# Patient Record
Sex: FEMALE | Race: WHITE | NOT HISPANIC OR LATINO
[De-identification: names, ages, dates, MRNs, and addresses within clinical notes are randomized per-mention and may not be internally consistent; named-entity substitution may affect disease eponyms.]

---

## 2017-05-18 ENCOUNTER — APPOINTMENT (OUTPATIENT)
Dept: ORTHOPEDIC SURGERY | Facility: CLINIC | Age: 72
End: 2017-05-18

## 2017-05-18 VITALS
HEIGHT: 65 IN | DIASTOLIC BLOOD PRESSURE: 66 MMHG | HEART RATE: 89 BPM | SYSTOLIC BLOOD PRESSURE: 93 MMHG | WEIGHT: 135 LBS | BODY MASS INDEX: 22.49 KG/M2

## 2017-05-18 DIAGNOSIS — Z86.39 PERSONAL HISTORY OF OTHER ENDOCRINE, NUTRITIONAL AND METABOLIC DISEASE: ICD-10-CM

## 2017-05-18 DIAGNOSIS — Z87.898 PERSONAL HISTORY OF OTHER SPECIFIED CONDITIONS: ICD-10-CM

## 2017-05-18 DIAGNOSIS — Z78.9 OTHER SPECIFIED HEALTH STATUS: ICD-10-CM

## 2017-05-18 DIAGNOSIS — Z87.39 PERSONAL HISTORY OF OTHER DISEASES OF THE MUSCULOSKELETAL SYSTEM AND CONNECTIVE TISSUE: ICD-10-CM

## 2017-05-18 DIAGNOSIS — Z86.69 PERSONAL HISTORY OF OTHER DISEASES OF THE NERVOUS SYSTEM AND SENSE ORGANS: ICD-10-CM

## 2017-05-18 DIAGNOSIS — Z82.61 FAMILY HISTORY OF ARTHRITIS: ICD-10-CM

## 2017-05-18 DIAGNOSIS — Z82.62 FAMILY HISTORY OF OSTEOPOROSIS: ICD-10-CM

## 2017-05-18 DIAGNOSIS — Z60.2 PROBLEMS RELATED TO LIVING ALONE: ICD-10-CM

## 2017-05-18 DIAGNOSIS — Z87.891 PERSONAL HISTORY OF NICOTINE DEPENDENCE: ICD-10-CM

## 2017-05-18 RX ORDER — PNV NO.95/FERROUS FUM/FOLIC AC 28MG-0.8MG
TABLET ORAL
Refills: 0 | Status: ACTIVE | COMMUNITY

## 2017-05-18 RX ORDER — FENOFIBRATE 145 MG/1
TABLET ORAL
Refills: 0 | Status: ACTIVE | COMMUNITY

## 2017-05-18 RX ORDER — ZINC OXIDE 13 %
CREAM (GRAM) TOPICAL
Refills: 0 | Status: ACTIVE | COMMUNITY

## 2017-05-18 SDOH — SOCIAL STABILITY - SOCIAL INSECURITY: PROBLEMS RELATED TO LIVING ALONE: Z60.2

## 2017-06-15 ENCOUNTER — OUTPATIENT (OUTPATIENT)
Dept: OUTPATIENT SERVICES | Facility: HOSPITAL | Age: 72
LOS: 1 days | End: 2017-06-15
Payer: MEDICARE

## 2017-06-15 VITALS
SYSTOLIC BLOOD PRESSURE: 114 MMHG | HEART RATE: 73 BPM | OXYGEN SATURATION: 97 % | RESPIRATION RATE: 16 BRPM | HEIGHT: 65 IN | DIASTOLIC BLOOD PRESSURE: 79 MMHG | WEIGHT: 136.03 LBS | TEMPERATURE: 98 F

## 2017-06-15 DIAGNOSIS — Z98.890 OTHER SPECIFIED POSTPROCEDURAL STATES: Chronic | ICD-10-CM

## 2017-06-15 DIAGNOSIS — M48.06 SPINAL STENOSIS, LUMBAR REGION: ICD-10-CM

## 2017-06-15 DIAGNOSIS — G47.30 SLEEP APNEA, UNSPECIFIED: ICD-10-CM

## 2017-06-15 DIAGNOSIS — Z90.13 ACQUIRED ABSENCE OF BILATERAL BREASTS AND NIPPLES: Chronic | ICD-10-CM

## 2017-06-15 DIAGNOSIS — Z01.818 ENCOUNTER FOR OTHER PREPROCEDURAL EXAMINATION: ICD-10-CM

## 2017-06-15 DIAGNOSIS — M51.26 OTHER INTERVERTEBRAL DISC DISPLACEMENT, LUMBAR REGION: ICD-10-CM

## 2017-06-15 DIAGNOSIS — M43.17 SPONDYLOLISTHESIS, LUMBOSACRAL REGION: ICD-10-CM

## 2017-06-15 LAB
ANION GAP SERPL CALC-SCNC: 16 MMOL/L — SIGNIFICANT CHANGE UP (ref 5–17)
BLD GP AB SCN SERPL QL: NEGATIVE — SIGNIFICANT CHANGE UP
BUN SERPL-MCNC: 20 MG/DL — SIGNIFICANT CHANGE UP (ref 7–23)
CALCIUM SERPL-MCNC: 10.1 MG/DL — SIGNIFICANT CHANGE UP (ref 8.4–10.5)
CHLORIDE SERPL-SCNC: 104 MMOL/L — SIGNIFICANT CHANGE UP (ref 96–108)
CO2 SERPL-SCNC: 24 MMOL/L — SIGNIFICANT CHANGE UP (ref 22–31)
CREAT SERPL-MCNC: 0.64 MG/DL — SIGNIFICANT CHANGE UP (ref 0.5–1.3)
GLUCOSE SERPL-MCNC: 86 MG/DL — SIGNIFICANT CHANGE UP (ref 70–99)
HCT VFR BLD CALC: 39.7 % — SIGNIFICANT CHANGE UP (ref 34.5–45)
HGB BLD-MCNC: 13.6 G/DL — SIGNIFICANT CHANGE UP (ref 11.5–15.5)
MCHC RBC-ENTMCNC: 31.4 PG — SIGNIFICANT CHANGE UP (ref 27–34)
MCHC RBC-ENTMCNC: 34.3 GM/DL — SIGNIFICANT CHANGE UP (ref 32–36)
MCV RBC AUTO: 91.7 FL — SIGNIFICANT CHANGE UP (ref 80–100)
PLATELET # BLD AUTO: 198 K/UL — SIGNIFICANT CHANGE UP (ref 150–400)
POTASSIUM SERPL-MCNC: 4 MMOL/L — SIGNIFICANT CHANGE UP (ref 3.5–5.3)
POTASSIUM SERPL-SCNC: 4 MMOL/L — SIGNIFICANT CHANGE UP (ref 3.5–5.3)
RBC # BLD: 4.33 M/UL — SIGNIFICANT CHANGE UP (ref 3.8–5.2)
RBC # FLD: 12.4 % — SIGNIFICANT CHANGE UP (ref 10.3–14.5)
RH IG SCN BLD-IMP: NEGATIVE — SIGNIFICANT CHANGE UP
SODIUM SERPL-SCNC: 144 MMOL/L — SIGNIFICANT CHANGE UP (ref 135–145)
WBC # BLD: 5.53 K/UL — SIGNIFICANT CHANGE UP (ref 3.8–10.5)
WBC # FLD AUTO: 5.53 K/UL — SIGNIFICANT CHANGE UP (ref 3.8–10.5)

## 2017-06-15 PROCEDURE — 80048 BASIC METABOLIC PNL TOTAL CA: CPT

## 2017-06-15 PROCEDURE — G0463: CPT

## 2017-06-15 PROCEDURE — 86900 BLOOD TYPING SEROLOGIC ABO: CPT

## 2017-06-15 PROCEDURE — 86850 RBC ANTIBODY SCREEN: CPT

## 2017-06-15 PROCEDURE — 85027 COMPLETE CBC AUTOMATED: CPT

## 2017-06-15 PROCEDURE — 86901 BLOOD TYPING SEROLOGIC RH(D): CPT

## 2017-06-15 RX ORDER — VANCOMYCIN HCL 1 G
1000 VIAL (EA) INTRAVENOUS ONCE
Qty: 0 | Refills: 0 | Status: DISCONTINUED | OUTPATIENT
Start: 2017-06-29 | End: 2017-07-03

## 2017-06-15 NOTE — H&P PST ADULT - HISTORY OF PRESENT ILLNESS
72 y/o f with PMH RAMÓN (non compliant with C-pap), HLD, migraines with lower back pain radiating to both legs since December 2016 presents pre L3-L5 bilateral lumbar laminectomies, discectomies posterior spinal fusion scheduled for 6/29/17.

## 2017-06-15 NOTE — H&P PST ADULT - ATTENDING COMMENTS
the patient is having intractable back and leg pain- the nature of the planned surgery, other options available to the patient, the risks and possible complications of this surgery and the other options, including but not limited to death, paralysis, nerve damage, infection, bleeding, failure of the surgery to relieve  symptoms, failure of fusion, dislodgement of interbody graft, if used, hardware failure/breakage/loosening, use of DBM and inherent risks pulmonary and/or cardiac complications, DVT, PE, UTI, spinal fluid leakage, possible need for further surgery in the future, adjacent segment deterioration, etc etc were discussed with the patient at length and the patient understands and wishes to proceed TM

## 2017-06-15 NOTE — H&P PST ADULT - ACTIVITY
limited currently due to pain, prior to December able to climb 2 flights stairs, vacuum, carry grocery's, laundry

## 2017-06-23 ENCOUNTER — APPOINTMENT (OUTPATIENT)
Dept: ORTHOPEDIC SURGERY | Facility: CLINIC | Age: 72
End: 2017-06-23

## 2017-06-23 VITALS — WEIGHT: 135 LBS | BODY MASS INDEX: 22.49 KG/M2 | HEIGHT: 65 IN

## 2017-06-29 ENCOUNTER — TRANSCRIPTION ENCOUNTER (OUTPATIENT)
Age: 72
End: 2017-06-29

## 2017-06-29 ENCOUNTER — INPATIENT (INPATIENT)
Facility: HOSPITAL | Age: 72
LOS: 3 days | Discharge: HOME | DRG: 460 | End: 2017-07-03
Attending: ORTHOPAEDIC SURGERY | Admitting: ORTHOPAEDIC SURGERY
Payer: MEDICARE

## 2017-06-29 ENCOUNTER — RESULT REVIEW (OUTPATIENT)
Age: 72
End: 2017-06-29

## 2017-06-29 ENCOUNTER — APPOINTMENT (OUTPATIENT)
Dept: ORTHOPEDIC SURGERY | Facility: HOSPITAL | Age: 72
End: 2017-06-29

## 2017-06-29 VITALS
TEMPERATURE: 99 F | OXYGEN SATURATION: 97 % | HEART RATE: 76 BPM | SYSTOLIC BLOOD PRESSURE: 129 MMHG | DIASTOLIC BLOOD PRESSURE: 81 MMHG | WEIGHT: 136.03 LBS | HEIGHT: 65 IN | RESPIRATION RATE: 18 BRPM

## 2017-06-29 DIAGNOSIS — M43.17 SPONDYLOLISTHESIS, LUMBOSACRAL REGION: ICD-10-CM

## 2017-06-29 DIAGNOSIS — Z90.13 ACQUIRED ABSENCE OF BILATERAL BREASTS AND NIPPLES: Chronic | ICD-10-CM

## 2017-06-29 DIAGNOSIS — M51.26 OTHER INTERVERTEBRAL DISC DISPLACEMENT, LUMBAR REGION: ICD-10-CM

## 2017-06-29 DIAGNOSIS — Z98.890 OTHER SPECIFIED POSTPROCEDURAL STATES: Chronic | ICD-10-CM

## 2017-06-29 LAB
ANION GAP SERPL CALC-SCNC: 13 MMOL/L — SIGNIFICANT CHANGE UP (ref 5–17)
BUN SERPL-MCNC: 15 MG/DL — SIGNIFICANT CHANGE UP (ref 7–23)
CALCIUM SERPL-MCNC: 8.7 MG/DL — SIGNIFICANT CHANGE UP (ref 8.4–10.5)
CHLORIDE SERPL-SCNC: 111 MMOL/L — HIGH (ref 96–108)
CO2 SERPL-SCNC: 24 MMOL/L — SIGNIFICANT CHANGE UP (ref 22–31)
CREAT SERPL-MCNC: 0.43 MG/DL — LOW (ref 0.5–1.3)
GLUCOSE SERPL-MCNC: 162 MG/DL — HIGH (ref 70–99)
HCT VFR BLD CALC: 34.2 % — LOW (ref 34.5–45)
HGB BLD-MCNC: 12.5 G/DL — SIGNIFICANT CHANGE UP (ref 11.5–15.5)
MCHC RBC-ENTMCNC: 33.9 PG — SIGNIFICANT CHANGE UP (ref 27–34)
MCHC RBC-ENTMCNC: 36.4 GM/DL — HIGH (ref 32–36)
MCV RBC AUTO: 93.2 FL — SIGNIFICANT CHANGE UP (ref 80–100)
PLATELET # BLD AUTO: 169 K/UL — SIGNIFICANT CHANGE UP (ref 150–400)
POTASSIUM SERPL-MCNC: 3.7 MMOL/L — SIGNIFICANT CHANGE UP (ref 3.5–5.3)
POTASSIUM SERPL-SCNC: 3.7 MMOL/L — SIGNIFICANT CHANGE UP (ref 3.5–5.3)
RBC # BLD: 3.67 M/UL — LOW (ref 3.8–5.2)
RBC # FLD: 11.4 % — SIGNIFICANT CHANGE UP (ref 10.3–14.5)
RH IG SCN BLD-IMP: NEGATIVE — SIGNIFICANT CHANGE UP
SODIUM SERPL-SCNC: 148 MMOL/L — HIGH (ref 135–145)
WBC # BLD: 8.3 K/UL — SIGNIFICANT CHANGE UP (ref 3.8–10.5)
WBC # FLD AUTO: 8.3 K/UL — SIGNIFICANT CHANGE UP (ref 3.8–10.5)

## 2017-06-29 PROCEDURE — 22614 ARTHRD PST TQ 1NTRSPC EA ADD: CPT

## 2017-06-29 PROCEDURE — 22842 INSERT SPINE FIXATION DEVICE: CPT

## 2017-06-29 PROCEDURE — 22612 ARTHRD PST TQ 1NTRSPC LUMBAR: CPT

## 2017-06-29 PROCEDURE — 22614 ARTHRD PST TQ 1NTRSPC EA ADD: CPT | Mod: 82

## 2017-06-29 PROCEDURE — 72100 X-RAY EXAM L-S SPINE 2/3 VWS: CPT | Mod: 26

## 2017-06-29 PROCEDURE — 20936 SP BONE AGRFT LOCAL ADD-ON: CPT

## 2017-06-29 PROCEDURE — 22842 INSERT SPINE FIXATION DEVICE: CPT | Mod: 82

## 2017-06-29 PROCEDURE — 88311 DECALCIFY TISSUE: CPT | Mod: 26

## 2017-06-29 PROCEDURE — 63030 LAMOT DCMPRN NRV RT 1 LMBR: CPT | Mod: 59

## 2017-06-29 PROCEDURE — 22612 ARTHRD PST TQ 1NTRSPC LUMBAR: CPT | Mod: 82

## 2017-06-29 PROCEDURE — 63047 LAM FACETEC & FORAMOT LUMBAR: CPT

## 2017-06-29 PROCEDURE — 63048 LAM FACETEC &FORAMOT EA ADDL: CPT

## 2017-06-29 PROCEDURE — 63047 LAM FACETEC & FORAMOT LUMBAR: CPT | Mod: 82

## 2017-06-29 PROCEDURE — 88304 TISSUE EXAM BY PATHOLOGIST: CPT | Mod: 26

## 2017-06-29 PROCEDURE — 63030 LAMOT DCMPRN NRV RT 1 LMBR: CPT | Mod: 82,59

## 2017-06-29 PROCEDURE — 63048 LAM FACETEC &FORAMOT EA ADDL: CPT | Mod: 82

## 2017-06-29 PROCEDURE — 20930 SP BONE ALGRFT MORSEL ADD-ON: CPT

## 2017-06-29 RX ORDER — DEXAMETHASONE 0.5 MG/5ML
5 ELIXIR ORAL EVERY 6 HOURS
Qty: 0 | Refills: 0 | Status: COMPLETED | OUTPATIENT
Start: 2017-06-29 | End: 2017-06-30

## 2017-06-29 RX ORDER — NALOXONE HYDROCHLORIDE 4 MG/.1ML
0.1 SPRAY NASAL
Qty: 0 | Refills: 0 | Status: DISCONTINUED | OUTPATIENT
Start: 2017-06-29 | End: 2017-06-30

## 2017-06-29 RX ORDER — MAGNESIUM HYDROXIDE 400 MG/1
30 TABLET, CHEWABLE ORAL EVERY 12 HOURS
Qty: 0 | Refills: 0 | Status: DISCONTINUED | OUTPATIENT
Start: 2017-06-29 | End: 2017-06-29

## 2017-06-29 RX ORDER — DEXAMETHASONE 0.5 MG/5ML
1 ELIXIR ORAL EVERY 6 HOURS
Qty: 0 | Refills: 0 | Status: CANCELLED | OUTPATIENT
Start: 2017-07-04 | End: 2017-06-29

## 2017-06-29 RX ORDER — SODIUM CHLORIDE 9 MG/ML
500 INJECTION INTRAMUSCULAR; INTRAVENOUS; SUBCUTANEOUS ONCE
Qty: 0 | Refills: 0 | Status: DISCONTINUED | OUTPATIENT
Start: 2017-06-29 | End: 2017-06-29

## 2017-06-29 RX ORDER — HYDROMORPHONE HYDROCHLORIDE 2 MG/ML
30 INJECTION INTRAMUSCULAR; INTRAVENOUS; SUBCUTANEOUS
Qty: 0 | Refills: 0 | Status: DISCONTINUED | OUTPATIENT
Start: 2017-06-29 | End: 2017-06-30

## 2017-06-29 RX ORDER — DEXAMETHASONE 0.5 MG/5ML
2 ELIXIR ORAL EVERY 6 HOURS
Qty: 0 | Refills: 0 | Status: DISCONTINUED | OUTPATIENT
Start: 2017-06-29 | End: 2017-06-29

## 2017-06-29 RX ORDER — CYCLOBENZAPRINE HYDROCHLORIDE 10 MG/1
10 TABLET, FILM COATED ORAL EVERY 8 HOURS
Qty: 0 | Refills: 0 | Status: DISCONTINUED | OUTPATIENT
Start: 2017-06-29 | End: 2017-07-03

## 2017-06-29 RX ORDER — SODIUM CHLORIDE 9 MG/ML
1000 INJECTION, SOLUTION INTRAVENOUS
Qty: 0 | Refills: 0 | Status: DISCONTINUED | OUTPATIENT
Start: 2017-06-29 | End: 2017-06-29

## 2017-06-29 RX ORDER — FENOFIBRATE,MICRONIZED 130 MG
145 CAPSULE ORAL DAILY
Qty: 0 | Refills: 0 | Status: DISCONTINUED | OUTPATIENT
Start: 2017-06-29 | End: 2017-07-03

## 2017-06-29 RX ORDER — SODIUM CHLORIDE 9 MG/ML
1000 INJECTION, SOLUTION INTRAVENOUS
Qty: 0 | Refills: 0 | Status: DISCONTINUED | OUTPATIENT
Start: 2017-06-29 | End: 2017-07-03

## 2017-06-29 RX ORDER — HYDROMORPHONE HYDROCHLORIDE 2 MG/ML
0.5 INJECTION INTRAMUSCULAR; INTRAVENOUS; SUBCUTANEOUS EVERY 6 HOURS
Qty: 0 | Refills: 0 | Status: DISCONTINUED | OUTPATIENT
Start: 2017-06-29 | End: 2017-06-29

## 2017-06-29 RX ORDER — ONDANSETRON 8 MG/1
4 TABLET, FILM COATED ORAL EVERY 6 HOURS
Qty: 0 | Refills: 0 | Status: DISCONTINUED | OUTPATIENT
Start: 2017-06-29 | End: 2017-06-30

## 2017-06-29 RX ORDER — ONDANSETRON 8 MG/1
4 TABLET, FILM COATED ORAL EVERY 6 HOURS
Qty: 0 | Refills: 0 | Status: DISCONTINUED | OUTPATIENT
Start: 2017-06-29 | End: 2017-06-29

## 2017-06-29 RX ORDER — ONDANSETRON 8 MG/1
4 TABLET, FILM COATED ORAL ONCE
Qty: 0 | Refills: 0 | Status: DISCONTINUED | OUTPATIENT
Start: 2017-06-29 | End: 2017-06-29

## 2017-06-29 RX ORDER — ACETAMINOPHEN 500 MG
650 TABLET ORAL EVERY 6 HOURS
Qty: 0 | Refills: 0 | Status: DISCONTINUED | OUTPATIENT
Start: 2017-06-29 | End: 2017-06-29

## 2017-06-29 RX ORDER — DEXAMETHASONE 0.5 MG/5ML
3 ELIXIR ORAL EVERY 6 HOURS
Qty: 0 | Refills: 0 | Status: DISCONTINUED | OUTPATIENT
Start: 2017-06-29 | End: 2017-06-29

## 2017-06-29 RX ORDER — HYDROMORPHONE HYDROCHLORIDE 2 MG/ML
0.25 INJECTION INTRAMUSCULAR; INTRAVENOUS; SUBCUTANEOUS
Qty: 0 | Refills: 0 | Status: DISCONTINUED | OUTPATIENT
Start: 2017-06-29 | End: 2017-06-29

## 2017-06-29 RX ORDER — DEXAMETHASONE 0.5 MG/5ML
1 ELIXIR ORAL EVERY 6 HOURS
Qty: 0 | Refills: 0 | Status: COMPLETED | OUTPATIENT
Start: 2017-07-02 | End: 2017-07-03

## 2017-06-29 RX ORDER — HYDROMORPHONE HYDROCHLORIDE 2 MG/ML
1 INJECTION INTRAMUSCULAR; INTRAVENOUS; SUBCUTANEOUS
Qty: 0 | Refills: 0 | Status: DISCONTINUED | OUTPATIENT
Start: 2017-06-29 | End: 2017-06-29

## 2017-06-29 RX ORDER — ACETAMINOPHEN 500 MG
650 TABLET ORAL EVERY 6 HOURS
Qty: 0 | Refills: 0 | Status: DISCONTINUED | OUTPATIENT
Start: 2017-06-29 | End: 2017-07-03

## 2017-06-29 RX ORDER — HYDROMORPHONE HYDROCHLORIDE 2 MG/ML
0.5 INJECTION INTRAMUSCULAR; INTRAVENOUS; SUBCUTANEOUS
Qty: 0 | Refills: 0 | Status: DISCONTINUED | OUTPATIENT
Start: 2017-06-29 | End: 2017-06-30

## 2017-06-29 RX ORDER — DOCUSATE SODIUM 100 MG
100 CAPSULE ORAL THREE TIMES A DAY
Qty: 0 | Refills: 0 | Status: DISCONTINUED | OUTPATIENT
Start: 2017-06-29 | End: 2017-07-03

## 2017-06-29 RX ORDER — FENOFIBRATE,MICRONIZED 130 MG
1 CAPSULE ORAL
Qty: 0 | Refills: 0 | COMMUNITY

## 2017-06-29 RX ORDER — SODIUM CHLORIDE 9 MG/ML
3 INJECTION INTRAMUSCULAR; INTRAVENOUS; SUBCUTANEOUS EVERY 8 HOURS
Qty: 0 | Refills: 0 | Status: DISCONTINUED | OUTPATIENT
Start: 2017-06-29 | End: 2017-06-29

## 2017-06-29 RX ORDER — DEXAMETHASONE 0.5 MG/5ML
5 ELIXIR ORAL EVERY 6 HOURS
Qty: 0 | Refills: 0 | Status: DISCONTINUED | OUTPATIENT
Start: 2017-06-29 | End: 2017-06-29

## 2017-06-29 RX ORDER — ACETAMINOPHEN 500 MG
1000 TABLET ORAL ONCE
Qty: 0 | Refills: 0 | Status: COMPLETED | OUTPATIENT
Start: 2017-06-29 | End: 2017-06-29

## 2017-06-29 RX ORDER — DEXAMETHASONE 0.5 MG/5ML
10 ELIXIR ORAL EVERY 8 HOURS
Qty: 0 | Refills: 0 | Status: DISCONTINUED | OUTPATIENT
Start: 2017-06-29 | End: 2017-06-29

## 2017-06-29 RX ORDER — DOCUSATE SODIUM 100 MG
100 CAPSULE ORAL THREE TIMES A DAY
Qty: 0 | Refills: 0 | Status: DISCONTINUED | OUTPATIENT
Start: 2017-06-29 | End: 2017-06-29

## 2017-06-29 RX ORDER — DEXAMETHASONE 0.5 MG/5ML
ELIXIR ORAL
Qty: 0 | Refills: 0 | Status: CANCELLED | OUTPATIENT
Start: 2017-07-01 | End: 2017-06-29

## 2017-06-29 RX ORDER — SENNA PLUS 8.6 MG/1
2 TABLET ORAL AT BEDTIME
Qty: 0 | Refills: 0 | Status: DISCONTINUED | OUTPATIENT
Start: 2017-06-29 | End: 2017-06-29

## 2017-06-29 RX ORDER — SODIUM CHLORIDE 9 MG/ML
500 INJECTION, SOLUTION INTRAVENOUS ONCE
Qty: 0 | Refills: 0 | Status: COMPLETED | OUTPATIENT
Start: 2017-06-29 | End: 2017-06-29

## 2017-06-29 RX ORDER — OXYCODONE HYDROCHLORIDE 5 MG/1
10 TABLET ORAL EVERY 4 HOURS
Qty: 0 | Refills: 0 | Status: DISCONTINUED | OUTPATIENT
Start: 2017-06-29 | End: 2017-06-29

## 2017-06-29 RX ORDER — DEXAMETHASONE 0.5 MG/5ML
3 ELIXIR ORAL EVERY 6 HOURS
Qty: 0 | Refills: 0 | Status: COMPLETED | OUTPATIENT
Start: 2017-06-30 | End: 2017-07-01

## 2017-06-29 RX ORDER — OXYCODONE HYDROCHLORIDE 5 MG/1
5 TABLET ORAL EVERY 4 HOURS
Qty: 0 | Refills: 0 | Status: DISCONTINUED | OUTPATIENT
Start: 2017-06-29 | End: 2017-06-29

## 2017-06-29 RX ORDER — DEXAMETHASONE 0.5 MG/5ML
5 ELIXIR ORAL EVERY 6 HOURS
Qty: 0 | Refills: 0 | Status: CANCELLED | OUTPATIENT
Start: 2017-07-01 | End: 2017-06-29

## 2017-06-29 RX ORDER — SODIUM CHLORIDE 9 MG/ML
1000 INJECTION INTRAMUSCULAR; INTRAVENOUS; SUBCUTANEOUS
Qty: 0 | Refills: 0 | Status: DISCONTINUED | OUTPATIENT
Start: 2017-06-29 | End: 2017-06-29

## 2017-06-29 RX ORDER — DEXAMETHASONE 0.5 MG/5ML
ELIXIR ORAL
Qty: 0 | Refills: 0 | Status: COMPLETED | OUTPATIENT
Start: 2017-06-29 | End: 2017-07-03

## 2017-06-29 RX ORDER — DEXAMETHASONE 0.5 MG/5ML
1 ELIXIR ORAL EVERY 6 HOURS
Qty: 0 | Refills: 0 | Status: DISCONTINUED | OUTPATIENT
Start: 2017-06-29 | End: 2017-06-29

## 2017-06-29 RX ORDER — VANCOMYCIN HCL 1 G
1000 VIAL (EA) INTRAVENOUS ONCE
Qty: 0 | Refills: 0 | Status: COMPLETED | OUTPATIENT
Start: 2017-06-29 | End: 2017-06-29

## 2017-06-29 RX ORDER — NALOXONE HYDROCHLORIDE 4 MG/.1ML
0.1 SPRAY NASAL
Qty: 0 | Refills: 0 | Status: DISCONTINUED | OUTPATIENT
Start: 2017-06-29 | End: 2017-07-03

## 2017-06-29 RX ORDER — CYCLOBENZAPRINE HYDROCHLORIDE 10 MG/1
10 TABLET, FILM COATED ORAL EVERY 8 HOURS
Qty: 0 | Refills: 0 | Status: DISCONTINUED | OUTPATIENT
Start: 2017-06-29 | End: 2017-06-29

## 2017-06-29 RX ORDER — DEXAMETHASONE 0.5 MG/5ML
2 ELIXIR ORAL EVERY 6 HOURS
Qty: 0 | Refills: 0 | Status: CANCELLED | OUTPATIENT
Start: 2017-07-03 | End: 2017-06-29

## 2017-06-29 RX ORDER — DEXAMETHASONE 0.5 MG/5ML
3 ELIXIR ORAL EVERY 6 HOURS
Qty: 0 | Refills: 0 | Status: CANCELLED | OUTPATIENT
Start: 2017-07-02 | End: 2017-06-29

## 2017-06-29 RX ORDER — EZETIMIBE 10 MG/1
1 TABLET ORAL
Qty: 0 | Refills: 0 | COMMUNITY

## 2017-06-29 RX ORDER — DEXAMETHASONE 0.5 MG/5ML
2 ELIXIR ORAL EVERY 6 HOURS
Qty: 0 | Refills: 0 | Status: COMPLETED | OUTPATIENT
Start: 2017-07-01 | End: 2017-07-02

## 2017-06-29 RX ADMIN — SODIUM CHLORIDE 80 MILLILITER(S): 9 INJECTION, SOLUTION INTRAVENOUS at 17:00

## 2017-06-29 RX ADMIN — Medication 250 MILLIGRAM(S): at 21:35

## 2017-06-29 RX ADMIN — HYDROMORPHONE HYDROCHLORIDE 0.25 MILLIGRAM(S): 2 INJECTION INTRAMUSCULAR; INTRAVENOUS; SUBCUTANEOUS at 13:30

## 2017-06-29 RX ADMIN — HYDROMORPHONE HYDROCHLORIDE 30 MILLILITER(S): 2 INJECTION INTRAMUSCULAR; INTRAVENOUS; SUBCUTANEOUS at 14:31

## 2017-06-29 RX ADMIN — Medication 400 MILLIGRAM(S): at 15:15

## 2017-06-29 RX ADMIN — HYDROMORPHONE HYDROCHLORIDE 0.25 MILLIGRAM(S): 2 INJECTION INTRAMUSCULAR; INTRAVENOUS; SUBCUTANEOUS at 13:20

## 2017-06-29 RX ADMIN — Medication 5 MILLIGRAM(S): at 19:02

## 2017-06-29 RX ADMIN — SODIUM CHLORIDE 1000 MILLILITER(S): 9 INJECTION, SOLUTION INTRAVENOUS at 16:15

## 2017-06-29 RX ADMIN — Medication 5 MILLIGRAM(S): at 23:44

## 2017-06-29 RX ADMIN — HYDROMORPHONE HYDROCHLORIDE 30 MILLILITER(S): 2 INJECTION INTRAMUSCULAR; INTRAVENOUS; SUBCUTANEOUS at 23:00

## 2017-06-29 RX ADMIN — Medication 1000 MILLIGRAM(S): at 15:30

## 2017-06-29 NOTE — PHYSICAL THERAPY INITIAL EVALUATION ADULT - DID THE PATIENT HAVE SURGERY?
Laminectomy and fusion of lumbar spine with instrumentation   PSF L3-5  Discectomy for herniated nucleus pulposus L3-5/yes

## 2017-06-29 NOTE — BRIEF OPERATIVE NOTE - PRE-OP DX
HNP (herniated nucleus pulposus), lumbar  06/29/2017    Marcellus Hanna  Lumbar stenosis  06/29/2017    Marcellus Hanna  Spondylolisthesis at L4-L5 level  06/29/2017    Marcellus Hanna

## 2017-06-29 NOTE — PHYSICAL THERAPY INITIAL EVALUATION ADULT - GENERAL OBSERVATIONS, REHAB EVAL
Pt encountered supine in bed, + Hemovac at lumbar supine, + A line, + PCA pump, + O2 via NC, + BP cuff, + cardiac monitor.

## 2017-06-29 NOTE — PHYSICAL THERAPY INITIAL EVALUATION ADULT - CRITERIA FOR SKILLED THERAPEUTIC INTERVENTIONS
rehab potential/anticipated discharge recommendation/impairments found/therapy frequency/predicted duration of therapy intervention/risk reduction/prevention/functional limitations in following categories/anticipated equipment needs at discharge

## 2017-06-29 NOTE — BRIEF OPERATIVE NOTE - PROCEDURE
Laminectomy and fusion of lumbar spine with instrumentation  06/29/2017  PSF L3-5  Active  [facility]  Discectomy for herniated nucleus pulposus  06/29/2017  L3-5  Active  [facility]

## 2017-06-29 NOTE — PHYSICAL THERAPY INITIAL EVALUATION ADULT - ADDITIONAL COMMENTS
71 year old female who PTA was living in a house alone, 5 steps to enter + hand rail,. independent in all fucntional mobilty and all ADL's declines any AD for gait.

## 2017-06-29 NOTE — PHYSICAL THERAPY INITIAL EVALUATION ADULT - MANUAL MUSCLE TESTING RESULTS, REHAB EVAL
grossly assessed due to/Bilat UE's WNL, pt able to do SLR however lacking muscle strength approx, 3/5 at LE's

## 2017-06-29 NOTE — CHART NOTE - NSCHARTNOTEFT_GEN_A_CORE
Patient Resting without complaints.  Denies Chest Pain, SOB, N/V.    T(C): 36.3 (06-29-17 @ 12:35)  T(F): 97.3 (06-29-17 @ 12:35)  HR: 86 (06-29-17 @ 16:00)  BP: 99/54 (06-29-17 @ 16:00)  RR: 14 (06-29-17 @ 16:00)  SpO2: 92% (06-29-17 @ 16:00)    Exam:   Gen: NAD; Alert/Oriented    Lumbar: Dsg CDI; HMV intact with good suction    B/L LE: Sensation/Motor grossly intact;  Calves Soft/NT; + DF/PF; Strength 5/5                         12.5   8.3   )-----------( 169      ( 29 Jun 2017 14:08 )             34.2       148<H>  |  111<H>  |  15  ----------------------------<  162<H>  3.7   |  24  |  0.43<L>    A/P :  71y Female s/p PSF L3-L5, Laminectomy L3-5, Discectomy L3-5; Stable  - Taper  - Pain control  - 500cc IVPB Normosol bolus for hypotension given  - DVT ppx w/SCDs; IS     - AM labs    - PT eval- WBAT in own brace  - Cont current tx    Elo Velasquez PA-C  Orthopedic Surgery  Pagers [phone]

## 2017-06-30 LAB
ANION GAP SERPL CALC-SCNC: 14 MMOL/L — SIGNIFICANT CHANGE UP (ref 5–17)
BASOPHILS # BLD AUTO: 0 K/UL — SIGNIFICANT CHANGE UP (ref 0–0.2)
BASOPHILS NFR BLD AUTO: 0 % — SIGNIFICANT CHANGE UP (ref 0–2)
BUN SERPL-MCNC: 9 MG/DL — SIGNIFICANT CHANGE UP (ref 7–23)
CALCIUM SERPL-MCNC: 9.1 MG/DL — SIGNIFICANT CHANGE UP (ref 8.4–10.5)
CHLORIDE SERPL-SCNC: 107 MMOL/L — SIGNIFICANT CHANGE UP (ref 96–108)
CO2 SERPL-SCNC: 25 MMOL/L — SIGNIFICANT CHANGE UP (ref 22–31)
CREAT SERPL-MCNC: 0.46 MG/DL — LOW (ref 0.5–1.3)
EOSINOPHIL # BLD AUTO: 0 K/UL — SIGNIFICANT CHANGE UP (ref 0–0.5)
EOSINOPHIL NFR BLD AUTO: 0 % — SIGNIFICANT CHANGE UP (ref 0–6)
GLUCOSE SERPL-MCNC: 144 MG/DL — HIGH (ref 70–99)
HCT VFR BLD CALC: 31.2 % — LOW (ref 34.5–45)
HGB BLD-MCNC: 10.7 G/DL — LOW (ref 11.5–15.5)
IMM GRANULOCYTES NFR BLD AUTO: 0.3 % — SIGNIFICANT CHANGE UP (ref 0–1.5)
LYMPHOCYTES # BLD AUTO: 0.43 K/UL — LOW (ref 1–3.3)
LYMPHOCYTES # BLD AUTO: 4.6 % — LOW (ref 13–44)
MCHC RBC-ENTMCNC: 31.3 PG — SIGNIFICANT CHANGE UP (ref 27–34)
MCHC RBC-ENTMCNC: 34.3 GM/DL — SIGNIFICANT CHANGE UP (ref 32–36)
MCV RBC AUTO: 91.2 FL — SIGNIFICANT CHANGE UP (ref 80–100)
MONOCYTES # BLD AUTO: 0.34 K/UL — SIGNIFICANT CHANGE UP (ref 0–0.9)
MONOCYTES NFR BLD AUTO: 3.6 % — SIGNIFICANT CHANGE UP (ref 2–14)
NEUTROPHILS # BLD AUTO: 8.6 K/UL — HIGH (ref 1.8–7.4)
NEUTROPHILS NFR BLD AUTO: 91.5 % — HIGH (ref 43–77)
PLATELET # BLD AUTO: 182 K/UL — SIGNIFICANT CHANGE UP (ref 150–400)
POTASSIUM SERPL-MCNC: 4.2 MMOL/L — SIGNIFICANT CHANGE UP (ref 3.5–5.3)
POTASSIUM SERPL-SCNC: 4.2 MMOL/L — SIGNIFICANT CHANGE UP (ref 3.5–5.3)
RBC # BLD: 3.42 M/UL — LOW (ref 3.8–5.2)
RBC # FLD: 12.8 % — SIGNIFICANT CHANGE UP (ref 10.3–14.5)
SODIUM SERPL-SCNC: 146 MMOL/L — HIGH (ref 135–145)
WBC # BLD: 9.4 K/UL — SIGNIFICANT CHANGE UP (ref 3.8–10.5)
WBC # FLD AUTO: 9.4 K/UL — SIGNIFICANT CHANGE UP (ref 3.8–10.5)

## 2017-06-30 RX ORDER — HYDROMORPHONE HYDROCHLORIDE 2 MG/ML
3 INJECTION INTRAMUSCULAR; INTRAVENOUS; SUBCUTANEOUS EVERY 4 HOURS
Qty: 0 | Refills: 0 | Status: DISCONTINUED | OUTPATIENT
Start: 2017-06-30 | End: 2017-07-03

## 2017-06-30 RX ORDER — SIMETHICONE 80 MG/1
80 TABLET, CHEWABLE ORAL EVERY 8 HOURS
Qty: 0 | Refills: 0 | Status: DISCONTINUED | OUTPATIENT
Start: 2017-06-30 | End: 2017-07-03

## 2017-06-30 RX ORDER — HYDROMORPHONE HYDROCHLORIDE 2 MG/ML
0.5 INJECTION INTRAMUSCULAR; INTRAVENOUS; SUBCUTANEOUS EVERY 4 HOURS
Qty: 0 | Refills: 0 | Status: DISCONTINUED | OUTPATIENT
Start: 2017-06-30 | End: 2017-07-03

## 2017-06-30 RX ORDER — HYDROMORPHONE HYDROCHLORIDE 2 MG/ML
2 INJECTION INTRAMUSCULAR; INTRAVENOUS; SUBCUTANEOUS EVERY 4 HOURS
Qty: 0 | Refills: 0 | Status: DISCONTINUED | OUTPATIENT
Start: 2017-06-30 | End: 2017-07-03

## 2017-06-30 RX ORDER — SENNA PLUS 8.6 MG/1
2 TABLET ORAL AT BEDTIME
Qty: 0 | Refills: 0 | Status: DISCONTINUED | OUTPATIENT
Start: 2017-06-30 | End: 2017-07-03

## 2017-06-30 RX ADMIN — Medication 145 MILLIGRAM(S): at 11:54

## 2017-06-30 RX ADMIN — Medication 1 TABLET(S): at 05:21

## 2017-06-30 RX ADMIN — HYDROMORPHONE HYDROCHLORIDE 30 MILLILITER(S): 2 INJECTION INTRAMUSCULAR; INTRAVENOUS; SUBCUTANEOUS at 05:23

## 2017-06-30 RX ADMIN — HYDROMORPHONE HYDROCHLORIDE 30 MILLILITER(S): 2 INJECTION INTRAMUSCULAR; INTRAVENOUS; SUBCUTANEOUS at 14:36

## 2017-06-30 RX ADMIN — Medication 5 MILLIGRAM(S): at 05:21

## 2017-06-30 RX ADMIN — HYDROMORPHONE HYDROCHLORIDE 30 MILLILITER(S): 2 INJECTION INTRAMUSCULAR; INTRAVENOUS; SUBCUTANEOUS at 19:43

## 2017-06-30 RX ADMIN — Medication 5 MILLIGRAM(S): at 11:54

## 2017-06-30 RX ADMIN — Medication 100 MILLIGRAM(S): at 05:21

## 2017-06-30 RX ADMIN — HYDROMORPHONE HYDROCHLORIDE 30 MILLILITER(S): 2 INJECTION INTRAMUSCULAR; INTRAVENOUS; SUBCUTANEOUS at 18:20

## 2017-06-30 RX ADMIN — Medication 3 MILLIGRAM(S): at 18:23

## 2017-06-30 RX ADMIN — Medication 1 TABLET(S): at 14:38

## 2017-06-30 RX ADMIN — SODIUM CHLORIDE 80 MILLILITER(S): 9 INJECTION, SOLUTION INTRAVENOUS at 18:25

## 2017-06-30 RX ADMIN — Medication 100 MILLIGRAM(S): at 21:51

## 2017-06-30 RX ADMIN — ONDANSETRON 4 MILLIGRAM(S): 8 TABLET, FILM COATED ORAL at 14:18

## 2017-06-30 RX ADMIN — Medication 1 TABLET(S): at 21:52

## 2017-06-30 RX ADMIN — SENNA PLUS 2 TABLET(S): 8.6 TABLET ORAL at 21:52

## 2017-06-30 RX ADMIN — HYDROMORPHONE HYDROCHLORIDE 30 MILLILITER(S): 2 INJECTION INTRAMUSCULAR; INTRAVENOUS; SUBCUTANEOUS at 10:22

## 2017-06-30 RX ADMIN — Medication 100 MILLIGRAM(S): at 14:38

## 2017-06-30 RX ADMIN — Medication 650 MILLIGRAM(S): at 21:52

## 2017-06-30 NOTE — PROGRESS NOTE ADULT - SUBJECTIVE AND OBJECTIVE BOX
Patient is a 71y old  Female who presents with a chief complaint of lower back pain (29 Jun 2017 05:27)      POST OPERATIVE DAY #:  [1 ]   Patient comfortable  C/O some heaviness in feet left > right-- back pain when trying to change positions    T(C): 37 (06-30-17 @ 04:45), Max: 37 (06-30-17 @ 04:45)  HR: 65 (06-30-17 @ 04:45) (56 - 99)  BP: 106/67 (06-30-17 @ 04:45) (94/50 - 118/58)  RR: 18 (06-30-17 @ 04:45) (10 - 19)  SpO2: 98% (06-30-17 @ 04:45) (92% - 100%)  Wt(kg): --    PHYSICAL EXAM:  NAD, Alert  Back: Dressing C/D/I; sensation grossly intact to light touch; (+) Distal Pulses; No Calf tenderness B/L, PAS        [x ] Upper extremity                    Bi          Tri        Delt                                                    R         5/5        5/5        5/5                                               L          5/5        5/5        5/5             [ x] Lower extremeity                  PF          DF         EHL       FHL                                                                                            R        5/5        5/5        5/5       5/5                                                        L         5/5        5/5        5/5       5/5      LABS:                        12.5   8.3   )-----------( 169      ( 29 Jun 2017 14:08 )             34.2     06-29    148<H>  |  111<H>  |  15  ----------------------------<  162<H>  3.7   |  24  |  0.43<L>    Ca    8.7      29 Jun 2017 14:08          RADIOLOGY & ADDITIONAL TESTS:

## 2017-06-30 NOTE — PROGRESS NOTE ADULT - SUBJECTIVE AND OBJECTIVE BOX
Day _1__ of Anesthesia Pain Management Service    SUBJECTIVE:    Pain Scale Score	At rest: ___ 	With Activity: ___ 	[ x] Refer to charted pain scores    THERAPY:    [ ] IV PCA Morphine		[ ] 5 mg/mL	[ ] 1 mg/mL  [x ] IV PCA Hydromorphone	[ ] 5 mg/mL	[x ] 1 mg/mL  [ ] IV PCA Fentanyl		[ ] 50 micrograms/mL    Demand dose  .2  lockout  6  (minutes)          MEDICATIONS  (STANDING):  vancomycin  IVPB 1000 milliGRAM(s) IV Intermittent once  fenofibrate Tablet 145 milliGRAM(s) Oral daily  HYDROmorphone PCA (1 mG/mL) 30 milliLiter(s) PCA Continuous PCA Continuous  docusate sodium 100 milliGRAM(s) Oral three times a day  calcium carbonate 1250 mG + Vitamin D (OsCal 500 + D) 1 Tablet(s) Oral three times a day  dexamethasone     Tablet   Oral   dexamethasone     Tablet 5 milliGRAM(s) Oral every 6 hours  dexamethasone     Tablet 3 milliGRAM(s) Oral every 6 hours  lactated ringers. 1000 milliLiter(s) (80 mL/Hr) IV Continuous <Continuous>    MEDICATIONS  (PRN):  naloxone Injectable 0.1 milliGRAM(s) IV Push every 3 minutes PRN For ANY of the following changes in patient status:  A. RR LESS THAN 10 breaths per minute, B. Oxygen saturation LESS THAN 90%, C. Sedation score of 6  HYDROmorphone PCA (1 mG/mL) Rescue Clinician Bolus 0.5 milliGRAM(s) IV Push every 15 minutes PRN for Pain Scale GREATER THAN 6  naloxone Injectable 0.1 milliGRAM(s) IV Push every 3 minutes PRN For ANY of the following changes in patient status:  A. RR LESS THAN 10 breaths per minute, B. Oxygen saturation LESS THAN 90%, C. Sedation score of 6  ondansetron Injectable 4 milliGRAM(s) IV Push every 6 hours PRN Nausea  acetaminophen   Tablet 650 milliGRAM(s) Oral every 6 hours PRN For Temp greater than 38 C (100.4 F)  cyclobenzaprine 10 milliGRAM(s) Oral every 8 hours PRN Muscle Spasm      OBJECTIVE:    Sedation Score:	[x ] Alert	[ ] Drowsy 	[ ] Arousable	[ ] Asleep	[ ] Unresponsive    Side Effects:	[ x] None	[ ] Nausea	[ ] Vomiting	[ ] Pruritus  		[ ] Other:    Vital Signs Last 24 Hrs  T(C): 36.6 (30 Jun 2017 08:29), Max: 37 (30 Jun 2017 04:45)  T(F): 97.9 (30 Jun 2017 08:29), Max: 98.6 (30 Jun 2017 04:45)  HR: 76 (30 Jun 2017 08:29) (56 - 99)  BP: 117/71 (30 Jun 2017 08:29) (94/50 - 118/58)  BP(mean): 78 (29 Jun 2017 22:00) (67 - 82)  RR: 16 (30 Jun 2017 08:29) (10 - 19)  SpO2: 98% (30 Jun 2017 08:29) (92% - 100%)    ASSESSMENT/ PLAN    Therapy to  be:	[x ] Continue   [ ] Discontinued   [ ] Change to prn Analgesics    Documentation and Verification of current medications:   [X] Done	[ ] Not done, not elligible    Comments:

## 2017-06-30 NOTE — PROGRESS NOTE ADULT - ASSESSMENT
49F with L3-5 laminectomy with posterior spinal fusion POD #1  - DC Singletary  an hour after physical therapy  - Monitor PCA, wean off as needed  - Hemovac 140/390  - Steroid Taper  - WBAT /  OOB/ PT  - Venodynes for DVT prophylaxis      Shamar Gee PGY3

## 2017-06-30 NOTE — PROGRESS NOTE ADULT - SUBJECTIVE AND OBJECTIVE BOX
Patient is a 71y old  Female who presents with a chief complaint of lower back pain (29 Jun 2017 05:27)      POST OPERATIVE DAY #:  [1]   Patient noting discomfort when moved in bed.  No complaints      T(C): 37 (06-30-17 @ 04:45), Max: 37 (06-30-17 @ 04:45)  HR: 65 (06-30-17 @ 04:45) (56 - 99)  BP: 106/67 (06-30-17 @ 04:45) (94/50 - 118/58)  RR: 18 (06-30-17 @ 04:45) (10 - 19)  SpO2: 98% (06-30-17 @ 04:45) (92% - 100%)  Wt(kg): --      PHYSICAL EXAM:  NAD, Alert  Back: Dressing C/D/I; sensation grossly intact to light touch; (+) Distal Pulses; No Calf tenderness B/L, PAS   Singletary in place             [ ] Lower extremity                  PF      DF         EHL       FHL                                                                                  R        5/5        5/5        5/5       5/5                                              L         5/5        5/5        5/5       5/5      LABS:                        12.5   8.3   )-----------( 169      ( 29 Jun 2017 14:08 )             34.2       06-29    148<H>  |  111<H>  |  15  ----------------------------<  162<H>  3.7   |  24  |  0.43<L>    Ca    8.7      29 Jun 2017 14:08      Hemovac: 140/390

## 2017-07-01 LAB
ANION GAP SERPL CALC-SCNC: 12 MMOL/L — SIGNIFICANT CHANGE UP (ref 5–17)
BUN SERPL-MCNC: 23 MG/DL — SIGNIFICANT CHANGE UP (ref 7–23)
CALCIUM SERPL-MCNC: 9.8 MG/DL — SIGNIFICANT CHANGE UP (ref 8.4–10.5)
CHLORIDE SERPL-SCNC: 104 MMOL/L — SIGNIFICANT CHANGE UP (ref 96–108)
CO2 SERPL-SCNC: 28 MMOL/L — SIGNIFICANT CHANGE UP (ref 22–31)
CREAT SERPL-MCNC: 0.45 MG/DL — LOW (ref 0.5–1.3)
GLUCOSE SERPL-MCNC: 123 MG/DL — HIGH (ref 70–99)
HCT VFR BLD CALC: 31.8 % — LOW (ref 34.5–45)
HGB BLD-MCNC: 11.6 G/DL — SIGNIFICANT CHANGE UP (ref 11.5–15.5)
MCHC RBC-ENTMCNC: 34.5 PG — HIGH (ref 27–34)
MCHC RBC-ENTMCNC: 36.4 GM/DL — HIGH (ref 32–36)
MCV RBC AUTO: 94.7 FL — SIGNIFICANT CHANGE UP (ref 80–100)
PLATELET # BLD AUTO: 190 K/UL — SIGNIFICANT CHANGE UP (ref 150–400)
POTASSIUM SERPL-MCNC: 4 MMOL/L — SIGNIFICANT CHANGE UP (ref 3.5–5.3)
POTASSIUM SERPL-SCNC: 4 MMOL/L — SIGNIFICANT CHANGE UP (ref 3.5–5.3)
RBC # BLD: 3.36 M/UL — LOW (ref 3.8–5.2)
RBC # FLD: 11.5 % — SIGNIFICANT CHANGE UP (ref 10.3–14.5)
SODIUM SERPL-SCNC: 144 MMOL/L — SIGNIFICANT CHANGE UP (ref 135–145)
WBC # BLD: 12.7 K/UL — HIGH (ref 3.8–10.5)
WBC # FLD AUTO: 12.7 K/UL — HIGH (ref 3.8–10.5)

## 2017-07-01 RX ORDER — SENNA PLUS 8.6 MG/1
2 TABLET ORAL
Qty: 0 | Refills: 0 | COMMUNITY
Start: 2017-07-01

## 2017-07-01 RX ORDER — ACETAMINOPHEN 500 MG
2 TABLET ORAL
Qty: 0 | Refills: 0 | COMMUNITY
Start: 2017-07-01

## 2017-07-01 RX ORDER — HYDROMORPHONE HYDROCHLORIDE 2 MG/ML
1 INJECTION INTRAMUSCULAR; INTRAVENOUS; SUBCUTANEOUS
Qty: 0 | Refills: 0 | COMMUNITY
Start: 2017-07-01

## 2017-07-01 RX ORDER — SIMETHICONE 80 MG/1
1 TABLET, CHEWABLE ORAL
Qty: 0 | Refills: 0 | COMMUNITY
Start: 2017-07-01

## 2017-07-01 RX ORDER — CYCLOBENZAPRINE HYDROCHLORIDE 10 MG/1
1 TABLET, FILM COATED ORAL
Qty: 0 | Refills: 0 | COMMUNITY
Start: 2017-07-01

## 2017-07-01 RX ORDER — DOCUSATE SODIUM 100 MG
1 CAPSULE ORAL
Qty: 0 | Refills: 0 | COMMUNITY
Start: 2017-07-01

## 2017-07-01 RX ORDER — ONDANSETRON 8 MG/1
4 TABLET, FILM COATED ORAL EVERY 6 HOURS
Qty: 0 | Refills: 0 | Status: DISCONTINUED | OUTPATIENT
Start: 2017-07-01 | End: 2017-07-03

## 2017-07-01 RX ADMIN — Medication 3 MILLIGRAM(S): at 11:28

## 2017-07-01 RX ADMIN — Medication 145 MILLIGRAM(S): at 11:28

## 2017-07-01 RX ADMIN — SENNA PLUS 2 TABLET(S): 8.6 TABLET ORAL at 21:50

## 2017-07-01 RX ADMIN — HYDROMORPHONE HYDROCHLORIDE 3 MILLIGRAM(S): 2 INJECTION INTRAMUSCULAR; INTRAVENOUS; SUBCUTANEOUS at 03:03

## 2017-07-01 RX ADMIN — Medication 3 MILLIGRAM(S): at 05:31

## 2017-07-01 RX ADMIN — Medication 100 MILLIGRAM(S): at 05:32

## 2017-07-01 RX ADMIN — ONDANSETRON 4 MILLIGRAM(S): 8 TABLET, FILM COATED ORAL at 09:00

## 2017-07-01 RX ADMIN — Medication 2 MILLIGRAM(S): at 17:38

## 2017-07-01 RX ADMIN — Medication 1 TABLET(S): at 05:31

## 2017-07-01 RX ADMIN — HYDROMORPHONE HYDROCHLORIDE 3 MILLIGRAM(S): 2 INJECTION INTRAMUSCULAR; INTRAVENOUS; SUBCUTANEOUS at 05:30

## 2017-07-01 RX ADMIN — Medication 3 MILLIGRAM(S): at 00:08

## 2017-07-01 RX ADMIN — HYDROMORPHONE HYDROCHLORIDE 3 MILLIGRAM(S): 2 INJECTION INTRAMUSCULAR; INTRAVENOUS; SUBCUTANEOUS at 11:58

## 2017-07-01 RX ADMIN — HYDROMORPHONE HYDROCHLORIDE 3 MILLIGRAM(S): 2 INJECTION INTRAMUSCULAR; INTRAVENOUS; SUBCUTANEOUS at 11:28

## 2017-07-01 RX ADMIN — HYDROMORPHONE HYDROCHLORIDE 3 MILLIGRAM(S): 2 INJECTION INTRAMUSCULAR; INTRAVENOUS; SUBCUTANEOUS at 18:08

## 2017-07-01 RX ADMIN — Medication 1 TABLET(S): at 21:50

## 2017-07-01 RX ADMIN — HYDROMORPHONE HYDROCHLORIDE 3 MILLIGRAM(S): 2 INJECTION INTRAMUSCULAR; INTRAVENOUS; SUBCUTANEOUS at 17:38

## 2017-07-01 RX ADMIN — Medication 100 MILLIGRAM(S): at 21:50

## 2017-07-01 NOTE — DISCHARGE NOTE ADULT - CARE PLAN
Principal Discharge DX:	Spinal stenosis, lumbar region Principal Discharge DX:	Spinal stenosis, lumbar region  Goal:	pain free ambulation  Instructions for follow-up, activity and diet:	DIET: resume regular diet regimen   WEIGHT-BEARING STATUS: weight bearing as tolerated in back brace when ambulating; put brace on and off in sitting position  BATHING: keep dressing clean and dry   DRESSING CHANGES: every 2 days until dressing dry

## 2017-07-01 NOTE — DISCHARGE NOTE ADULT - HOSPITAL COURSE
· Primary Care Provider	Dr ArtBrlhsnc-[phone]    Chief Complaint/Reason for Visit/HPI:   Chief Complaint/Reason for Visit:  Chief Complaint/Reason for Admission	lower back pain    History of Present Illness:  History of Present Illness	  72 y/o f with PMH RAMÓN (non compliant with C-pap), HLD, migraines with lower back pain radiating to both legs since December 2016 presents pre L3-L5 bilateral lumbar laminectomies, discectomies posterior spinal fusion scheduled for 6/29/17.     Allergies/Medications:   Allergies:        Allergies:  	penicillins: Drug Category, Vomiting, Diarrhea       Intolerances:  	codeine: Drug, Nausea, Vomiting    Home Medications:   * Patient Currently Takes Medications as of 15-Dane-2017 13:24 documented in Prescription Writer  · 	fenofibrate 145 mg oral tablet: Last Dose Taken:  , 1 tab(s) orally once a day  · 	Zetia 10 mg oral tablet: Last Dose Taken:  , 1 tab(s) orally once a day    PMH/PSH/FH/SH:   Past Medical History:  Claustrophobia    Migraines    Sleep apnea  does not use c-pap  Spinal stenosis, lumbar region.    Past Surgical History:  H/O bilateral breast reduction surgery    Status post bunionectomy  bilateral.    Hospital Course:  6/28: Admitted to [facility];   6/29: underwent L2-L3 Laminectomy/posterior spinal fusion; tolerated procedure well  6/30: evaluated by physical therapy/occupational therapy who recommended: rehab  Pt stable for discharge on: 7/3/17  Discharge H/H: · Primary Care Provider	Dr ArtFilsgnm-[phone]    Chief Complaint/Reason for Visit/HPI:   Chief Complaint/Reason for Visit:  Chief Complaint/Reason for Admission	lower back pain    History of Present Illness:  History of Present Illness	  72 y/o f with PMH RAMÓN (non compliant with C-pap), HLD, migraines with lower back pain radiating to both legs since December 2016 presents pre L3-L5 bilateral lumbar laminectomies, discectomies posterior spinal fusion scheduled for 6/29/17.     Allergies/Medications:   Allergies:        Allergies:  	penicillins: Drug Category, Vomiting, Diarrhea       Intolerances:  	codeine: Drug, Nausea, Vomiting    Home Medications:   * Patient Currently Takes Medications as of 15-Dane-2017 13:24 documented in Prescription Writer  · 	fenofibrate 145 mg oral tablet: Last Dose Taken:  , 1 tab(s) orally once a day  · 	Zetia 10 mg oral tablet: Last Dose Taken:  , 1 tab(s) orally once a day    PMH/PSH/FH/SH:   Past Medical History:  Claustrophobia    Migraines    Sleep apnea  does not use c-pap  Spinal stenosis, lumbar region.    Past Surgical History:  H/O bilateral breast reduction surgery    Status post bunionectomy  bilateral.    Hospital Course:  6/28: Admitted to [facility];   6/29: underwent L2-L3 Laminectomy/posterior spinal fusion; tolerated procedure well  6/30: evaluated by physical therapy/occupational therapy who recommended: rehab  Pt stable for discharge on: 7/3/17  Discharge H/H: 10.1/32

## 2017-07-01 NOTE — DISCHARGE NOTE ADULT - NS AS ACTIVITY OBS
No Heavy lifting/straining/Walking-Indoors allowed/Stairs allowed/Do not make important decisions/Walking-Outdoors allowed/Do not drive or operate machinery

## 2017-07-01 NOTE — PROGRESS NOTE ADULT - ASSESSMENT
Patient is a 71y old  Female who presents with a chief complaint of lower back pain (29 Jun 2017 05:27) s/p L3-L5 Lami/PSF. Pt stable.

## 2017-07-01 NOTE — DISCHARGE NOTE ADULT - CARE PROVIDER_API CALL
Gary Pate), Orthopaedic Surgery  [address] [place] [address]  Phone: [phone]  Fax: [phone]

## 2017-07-01 NOTE — DISCHARGE NOTE ADULT - MEDICATION SUMMARY - MEDICATIONS TO TAKE
I will START or STAY ON the medications listed below when I get home from the hospital:    acetaminophen 325 mg oral tablet  -- 2 tab(s) by mouth every 6 hours, As needed, For Temp greater than 38 C (100.4 F)  -- Indication: For pain / fever    HYDROmorphone 2 mg oral tablet  -- 1-2 tab(s) by mouth every 4 hours, As needed, Moderate Pain - Severe Pain  -- Indication: For pain    HYDROmorphone  -- 3 milligram(s) by mouth every 4 hours, As Needed (severe pain)  -- Indication: For pain    fenofibrate 145 mg oral tablet  -- 1 tab(s) by mouth once a day  -- Indication: For Cholesterol    Zetia 10 mg oral tablet  -- 1 tab(s) by mouth once a day  -- Indication: For Cholesterol    famotidine 20 mg oral tablet  -- 1 tab(s) by mouth once a day  -- Indication: For reflux    docusate sodium 100 mg oral capsule  -- 1 cap(s) by mouth 3 times a day  -- while on pain medications   -- Indication: For Stool softener    senna oral tablet  -- 2 tab(s) by mouth once a day (at bedtime)  -- while on pain medications   -- Indication: For laxative    simethicone 80 mg oral tablet, chewable  -- 1 tab(s) by mouth every 8 hours, As needed, Indigestion  -- Indication: For indigestion    cyclobenzaprine 10 mg oral tablet  -- 1 tab(s) by mouth every 8 hours, As needed, Muscle Spasm  -- Indication: For Spasm    calcium-vitamin D 500 mg-200 intl units oral tablet  -- 1 tab(s) by mouth 3 times a day  -- Indication: For Supplement    Multiple Vitamins oral tablet  -- 1 tab(s) by mouth once a day  -- Indication: For Supplement

## 2017-07-01 NOTE — DISCHARGE NOTE ADULT - ADDITIONAL INSTRUCTIONS
- Follow up with Dr. Pate in 3-4 weeks after surgery; call office for appointment upon discharge from rehab  - - please follow up with your primary care doctor after discharge from hospital to discuss your hospital stay and any changes to you medications

## 2017-07-01 NOTE — PROGRESS NOTE ADULT - SUBJECTIVE AND OBJECTIVE BOX
Patient is a 71y old  Female who presents with a chief complaint of lower back pain (29 Jun 2017 05:27)      POST OPERATIVE DAY #:  [2]   Patient comfortable  No complaints    T(C): 36.7 (07-01-17 @ 04:35), Max: 39.4 (06-30-17 @ 18:05)  HR: 76 (07-01-17 @ 04:35) (71 - 89)  BP: 112/65 (07-01-17 @ 04:35) (94/60 - 117/71)  RR: 18 (07-01-17 @ 04:35) (16 - 18)  SpO2: 96% (07-01-17 @ 04:35) (95% - 98%)    PHYSICAL EXAM:  NAD, Alert  Back: Dressing C/D/I; sensation grossly intact to light touch; (+) Distal Pulses; No Calf tenderness B/L, PAS                 Lower extremity                             PF          DF         EHL       FHL                                                                                            R        5/5         5/5          5/5       5/5                                                        L         5/5        5/5          5/5       5/5      LABS:                        10.7   9.40  )-----------( 182      ( 30 Jun 2017 08:19 )             31.2     06-30    146<H>  |  107  |  9   ----------------------------<  144<H>  4.2   |  25  |  0.46<L>    Ca    9.1      30 Jun 2017 08:29 Patient is a 71y old  Female who presents with a chief complaint of lower back pain (29 Jun 2017 05:27)      POST OPERATIVE DAY #:  [2]   Patient comfortable  No complaints    T(C): 36.7 (07-01-17 @ 04:35), Max: 39.4 (06-30-17 @ 18:05)  HR: 76 (07-01-17 @ 04:35) (71 - 89)  BP: 112/65 (07-01-17 @ 04:35) (94/60 - 117/71)  RR: 18 (07-01-17 @ 04:35) (16 - 18)  SpO2: 96% (07-01-17 @ 04:35) (95% - 98%)    PHYSICAL EXAM:  NAD, Alert  Back: Dressing C/D/I; sensation grossly intact to light touch; (+) Distal Pulses; No Calf tenderness B/L, PAS   Hemovac in place to self suction; 140/220                Lower extremity                             PF          DF         EHL       FHL                                                                                            R        5/5         5/5          5/5       5/5                                                        L         5/5        5/5          5/5       5/5      LABS:                        10.7   9.40  )-----------( 182      ( 30 Jun 2017 08:19 )             31.2     06-30    146<H>  |  107  |  9   ----------------------------<  144<H>  4.2   |  25  |  0.46<L>    Ca    9.1      30 Jun 2017 08:29

## 2017-07-01 NOTE — DISCHARGE NOTE ADULT - PATIENT PORTAL LINK FT
“You can access the [employer] Patient Portal, offered by [facility], by registering with the following website: [email or web address]”

## 2017-07-01 NOTE — DISCHARGE NOTE ADULT - PLAN OF CARE
pain free ambulation DIET: resume regular diet regimen   WEIGHT-BEARING STATUS: weight bearing as tolerated in back brace when ambulating; put brace on and off in sitting position  BATHING: keep dressing clean and dry   DRESSING CHANGES: every 2 days until dressing dry

## 2017-07-02 LAB
ANION GAP SERPL CALC-SCNC: 16 MMOL/L — SIGNIFICANT CHANGE UP (ref 5–17)
BASOPHILS # BLD AUTO: 0.01 K/UL — SIGNIFICANT CHANGE UP (ref 0–0.2)
BASOPHILS NFR BLD AUTO: 0.1 % — SIGNIFICANT CHANGE UP (ref 0–2)
BUN SERPL-MCNC: 20 MG/DL — SIGNIFICANT CHANGE UP (ref 7–23)
CALCIUM SERPL-MCNC: 9.2 MG/DL — SIGNIFICANT CHANGE UP (ref 8.4–10.5)
CHLORIDE SERPL-SCNC: 100 MMOL/L — SIGNIFICANT CHANGE UP (ref 96–108)
CO2 SERPL-SCNC: 26 MMOL/L — SIGNIFICANT CHANGE UP (ref 22–31)
CREAT SERPL-MCNC: 0.48 MG/DL — LOW (ref 0.5–1.3)
EOSINOPHIL # BLD AUTO: 0.01 K/UL — SIGNIFICANT CHANGE UP (ref 0–0.5)
EOSINOPHIL NFR BLD AUTO: 0.1 % — SIGNIFICANT CHANGE UP (ref 0–6)
GLUCOSE SERPL-MCNC: 110 MG/DL — HIGH (ref 70–99)
HCT VFR BLD CALC: 30.4 % — LOW (ref 34.5–45)
HGB BLD-MCNC: 10.2 G/DL — LOW (ref 11.5–15.5)
IMM GRANULOCYTES NFR BLD AUTO: 0.2 % — SIGNIFICANT CHANGE UP (ref 0–1.5)
LYMPHOCYTES # BLD AUTO: 1.15 K/UL — SIGNIFICANT CHANGE UP (ref 1–3.3)
LYMPHOCYTES # BLD AUTO: 12.1 % — LOW (ref 13–44)
MCHC RBC-ENTMCNC: 31 PG — SIGNIFICANT CHANGE UP (ref 27–34)
MCHC RBC-ENTMCNC: 33.6 GM/DL — SIGNIFICANT CHANGE UP (ref 32–36)
MCV RBC AUTO: 92.4 FL — SIGNIFICANT CHANGE UP (ref 80–100)
MONOCYTES # BLD AUTO: 0.88 K/UL — SIGNIFICANT CHANGE UP (ref 0–0.9)
MONOCYTES NFR BLD AUTO: 9.3 % — SIGNIFICANT CHANGE UP (ref 2–14)
NEUTROPHILS # BLD AUTO: 7.4 K/UL — SIGNIFICANT CHANGE UP (ref 1.8–7.4)
NEUTROPHILS NFR BLD AUTO: 78.2 % — HIGH (ref 43–77)
PLATELET # BLD AUTO: 177 K/UL — SIGNIFICANT CHANGE UP (ref 150–400)
POTASSIUM SERPL-MCNC: 4.2 MMOL/L — SIGNIFICANT CHANGE UP (ref 3.5–5.3)
POTASSIUM SERPL-SCNC: 4.2 MMOL/L — SIGNIFICANT CHANGE UP (ref 3.5–5.3)
RBC # BLD: 3.29 M/UL — LOW (ref 3.8–5.2)
RBC # FLD: 12.9 % — SIGNIFICANT CHANGE UP (ref 10.3–14.5)
SODIUM SERPL-SCNC: 142 MMOL/L — SIGNIFICANT CHANGE UP (ref 135–145)
WBC # BLD: 9.47 K/UL — SIGNIFICANT CHANGE UP (ref 3.8–10.5)
WBC # FLD AUTO: 9.47 K/UL — SIGNIFICANT CHANGE UP (ref 3.8–10.5)

## 2017-07-02 RX ADMIN — Medication 2 MILLIGRAM(S): at 00:26

## 2017-07-02 RX ADMIN — Medication 100 MILLIGRAM(S): at 05:44

## 2017-07-02 RX ADMIN — HYDROMORPHONE HYDROCHLORIDE 2 MILLIGRAM(S): 2 INJECTION INTRAMUSCULAR; INTRAVENOUS; SUBCUTANEOUS at 11:14

## 2017-07-02 RX ADMIN — HYDROMORPHONE HYDROCHLORIDE 2 MILLIGRAM(S): 2 INJECTION INTRAMUSCULAR; INTRAVENOUS; SUBCUTANEOUS at 05:49

## 2017-07-02 RX ADMIN — Medication 100 MILLIGRAM(S): at 13:44

## 2017-07-02 RX ADMIN — Medication 1 TABLET(S): at 21:56

## 2017-07-02 RX ADMIN — Medication 100 MILLIGRAM(S): at 21:57

## 2017-07-02 RX ADMIN — Medication 145 MILLIGRAM(S): at 11:14

## 2017-07-02 RX ADMIN — HYDROMORPHONE HYDROCHLORIDE 2 MILLIGRAM(S): 2 INJECTION INTRAMUSCULAR; INTRAVENOUS; SUBCUTANEOUS at 19:23

## 2017-07-02 RX ADMIN — SENNA PLUS 2 TABLET(S): 8.6 TABLET ORAL at 21:56

## 2017-07-02 RX ADMIN — Medication 1 TABLET(S): at 05:44

## 2017-07-02 RX ADMIN — Medication 1 TABLET(S): at 13:44

## 2017-07-02 RX ADMIN — Medication 2 MILLIGRAM(S): at 11:14

## 2017-07-02 RX ADMIN — Medication 2 MILLIGRAM(S): at 05:44

## 2017-07-02 RX ADMIN — Medication 1 MILLIGRAM(S): at 17:07

## 2017-07-02 RX ADMIN — HYDROMORPHONE HYDROCHLORIDE 2 MILLIGRAM(S): 2 INJECTION INTRAMUSCULAR; INTRAVENOUS; SUBCUTANEOUS at 11:45

## 2017-07-02 NOTE — PROGRESS NOTE ADULT - SUBJECTIVE AND OBJECTIVE BOX
Patient is a 71y old  Female who presents with a chief complaint of lower back pain (29 Jun 2017 05:27)      POST OPERATIVE DAY #:  [3]   Patient comfortable  No complaints    Vital Signs Last 24 Hrs  T(C): 36.6 (02 Jul 2017 05:15), Max: 36.8 (01 Jul 2017 13:28)  T(F): 97.8 (02 Jul 2017 05:15), Max: 98.3 (01 Jul 2017 13:28)  HR: 81 (02 Jul 2017 05:15) (70 - 81)  BP: 133/77 (02 Jul 2017 05:15) (100/64 - 133/77)  BP(mean): --  RR: 16 (02 Jul 2017 05:15) (16 - 16)  SpO2: 98% (02 Jul 2017 05:15) (95% - 98%)    PHYSICAL EXAM:  NAD, Alert  Back: Dressing C/D/I; sensation grossly intact to light touch; (+) Distal Pulses; No Calf tenderness B/L, PAS   Hemovac in place to self suction; 35/55                Lower extremity                             PF          DF         EHL       FHL                                                                                            R        5/5         5/5          5/5       5/5                                                        L         5/5        5/5          5/5       5/5      LABS:                        10.7   9.40  )-----------( 182      ( 30 Jun 2017 08:19 )             31.2     06-30    146<H>  |  107  |  9   ----------------------------<  144<H>  4.2   |  25  |  0.46<L>    Ca    9.1      30 Jun 2017 08:29

## 2017-07-02 NOTE — PROGRESS NOTE ADULT - PROBLEM SELECTOR PLAN 1
- Pain control  - WBAT/OOB with brace; put brace on and off in sitting position  - Sarai IS  - Continue current tx    Jacoby Farrell PA-C  Orthopedic Surgery  Pager: [phone]  Spectra: [phone]
Doing well; OOB as tolerated; brace when upright; PTx WBAT; Discharge planning
- Pain control  - WBAT/OOB with brace; put brace on and off in sitting position  - Sarai IS  - Continue current tx    Jacoby Farrell PA-C  Orthopedic Surgery  Pager: [phone]  Spectra: [phone]

## 2017-07-02 NOTE — CHART NOTE - NSCHARTNOTEFT_GEN_A_CORE
Patient visited for drain pull. Sutures d/c'd.  Hemostasis achieved, patient tolerated well, tip intact. 4x4 Dsg placed.    Jacoby Farrell PA-C  Orthopedic Surgery  Pager: [phone]  Spectra: [phone]

## 2017-07-03 VITALS
DIASTOLIC BLOOD PRESSURE: 59 MMHG | SYSTOLIC BLOOD PRESSURE: 100 MMHG | OXYGEN SATURATION: 96 % | TEMPERATURE: 98 F | RESPIRATION RATE: 16 BRPM | HEART RATE: 77 BPM

## 2017-07-03 LAB — SURGICAL PATHOLOGY STUDY: SIGNIFICANT CHANGE UP

## 2017-07-03 PROCEDURE — 86900 BLOOD TYPING SEROLOGIC ABO: CPT

## 2017-07-03 PROCEDURE — C1889: CPT

## 2017-07-03 PROCEDURE — 97116 GAIT TRAINING THERAPY: CPT

## 2017-07-03 PROCEDURE — C1713: CPT

## 2017-07-03 PROCEDURE — C1769: CPT

## 2017-07-03 PROCEDURE — 72100 X-RAY EXAM L-S SPINE 2/3 VWS: CPT

## 2017-07-03 PROCEDURE — 80048 BASIC METABOLIC PNL TOTAL CA: CPT

## 2017-07-03 PROCEDURE — 88311 DECALCIFY TISSUE: CPT

## 2017-07-03 PROCEDURE — 85027 COMPLETE CBC AUTOMATED: CPT

## 2017-07-03 PROCEDURE — 97110 THERAPEUTIC EXERCISES: CPT

## 2017-07-03 PROCEDURE — 97162 PT EVAL MOD COMPLEX 30 MIN: CPT

## 2017-07-03 PROCEDURE — 88304 TISSUE EXAM BY PATHOLOGIST: CPT

## 2017-07-03 PROCEDURE — 86901 BLOOD TYPING SEROLOGIC RH(D): CPT

## 2017-07-03 RX ORDER — HYDROMORPHONE HYDROCHLORIDE 2 MG/ML
3 INJECTION INTRAMUSCULAR; INTRAVENOUS; SUBCUTANEOUS
Qty: 0 | Refills: 0 | COMMUNITY
Start: 2017-07-03

## 2017-07-03 RX ORDER — FAMOTIDINE 10 MG/ML
20 INJECTION INTRAVENOUS DAILY
Qty: 0 | Refills: 0 | Status: DISCONTINUED | OUTPATIENT
Start: 2017-07-03 | End: 2017-07-03

## 2017-07-03 RX ORDER — FAMOTIDINE 10 MG/ML
1 INJECTION INTRAVENOUS
Qty: 0 | Refills: 0 | COMMUNITY
Start: 2017-07-03

## 2017-07-03 RX ADMIN — Medication 100 MILLIGRAM(S): at 11:53

## 2017-07-03 RX ADMIN — Medication 100 MILLIGRAM(S): at 05:36

## 2017-07-03 RX ADMIN — FAMOTIDINE 20 MILLIGRAM(S): 10 INJECTION INTRAVENOUS at 11:53

## 2017-07-03 RX ADMIN — Medication 1 TABLET(S): at 11:53

## 2017-07-03 RX ADMIN — Medication 1 TABLET(S): at 05:36

## 2017-07-03 RX ADMIN — Medication 145 MILLIGRAM(S): at 11:53

## 2017-07-03 RX ADMIN — Medication 1 MILLIGRAM(S): at 11:53

## 2017-07-03 RX ADMIN — HYDROMORPHONE HYDROCHLORIDE 2 MILLIGRAM(S): 2 INJECTION INTRAMUSCULAR; INTRAVENOUS; SUBCUTANEOUS at 12:25

## 2017-07-03 RX ADMIN — Medication 1 MILLIGRAM(S): at 00:33

## 2017-07-03 RX ADMIN — HYDROMORPHONE HYDROCHLORIDE 2 MILLIGRAM(S): 2 INJECTION INTRAMUSCULAR; INTRAVENOUS; SUBCUTANEOUS at 11:53

## 2017-07-03 RX ADMIN — HYDROMORPHONE HYDROCHLORIDE 2 MILLIGRAM(S): 2 INJECTION INTRAMUSCULAR; INTRAVENOUS; SUBCUTANEOUS at 08:05

## 2017-07-03 RX ADMIN — Medication 1 MILLIGRAM(S): at 05:36

## 2017-07-03 RX ADMIN — HYDROMORPHONE HYDROCHLORIDE 2 MILLIGRAM(S): 2 INJECTION INTRAMUSCULAR; INTRAVENOUS; SUBCUTANEOUS at 07:35

## 2017-07-03 NOTE — PROGRESS NOTE ADULT - SUBJECTIVE AND OBJECTIVE BOX
Patient is a 71y old  Female who presents with a chief complaint of lower back pain (01 Jul 2017 12:26)  Patient s/p L3-L5 laminectomy, PSF     POST OPERATIVE DAY #:  [4 ]   Patient comfortable  No complaints    T(C): 36.6 (07-02-17 @ 23:59), Max: 36.9 (07-02-17 @ 20:49)  HR: 68 (07-02-17 @ 23:59) (68 - 81)  BP: 111/65 (07-02-17 @ 23:59) (90/56 - 111/65)  RR: 16 (07-02-17 @ 23:59) (16 - 16)  SpO2: 95% (07-02-17 @ 23:59) (95% - 98%)  Wt(kg): --    PHYSICAL EXAM:Patient states LLE weakness improved  NAD, Alert  Back: Dressing C/D/I; sensation grossly intact to light touch; (+) Distal Pulses; No Calf tenderness B/L, PAS        [ ] Upper extremity                    Bi          Tri        Delt                                                    R         5/5        5/5        5/5                                               L          5/5        5/5        5/5             [ ] Lower extremeity                  PF          DF         EHL       FHL                                                                                            R        5/5        5/5        5/5       5/5                                                        L         5/5        5/5        5/5       5/5      LABS:                        10.2   9.47  )-----------( 177      ( 02 Jul 2017 08:37 )             30.4     07-02    142  |  100  |  20  ----------------------------<  110<H>  4.2   |  26  |  0.48<L>    Ca    9.2      02 Jul 2017 08:43

## 2017-07-03 NOTE — PROGRESS NOTE ADULT - ASSESSMENT
50 y/o fm s/p L3-5 lami, PSF POD#4, discharge to rehab when bed available    Manjula Talbot PA-C  Orthopaedic Surgery  Team pager [phone]  [facility] [phone]  [phone]

## 2017-07-14 ENCOUNTER — APPOINTMENT (OUTPATIENT)
Dept: ORTHOPEDIC SURGERY | Facility: CLINIC | Age: 72
End: 2017-07-14

## 2017-07-14 VITALS — HEIGHT: 65 IN | BODY MASS INDEX: 22.49 KG/M2 | WEIGHT: 135 LBS

## 2017-07-14 RX ORDER — ACETAMINOPHEN 325 MG/1
TABLET, FILM COATED ORAL
Refills: 0 | Status: ACTIVE | COMMUNITY

## 2017-08-14 ENCOUNTER — APPOINTMENT (OUTPATIENT)
Dept: ORTHOPEDIC SURGERY | Facility: CLINIC | Age: 72
End: 2017-08-14
Payer: MEDICARE

## 2017-08-14 VITALS — HEIGHT: 65 IN | WEIGHT: 135 LBS | BODY MASS INDEX: 22.49 KG/M2

## 2017-08-14 PROCEDURE — 99024 POSTOP FOLLOW-UP VISIT: CPT

## 2017-08-14 PROCEDURE — 72100 X-RAY EXAM L-S SPINE 2/3 VWS: CPT

## 2017-08-14 RX ORDER — TRAMADOL HYDROCHLORIDE 50 MG/1
50 TABLET, COATED ORAL
Qty: 60 | Refills: 0 | Status: DISCONTINUED | OUTPATIENT
Start: 2017-07-14 | End: 2017-08-14

## 2017-08-14 RX ORDER — TRAMADOL HYDROCHLORIDE 50 MG/1
50 TABLET, COATED ORAL
Qty: 60 | Refills: 0 | Status: DISCONTINUED | COMMUNITY
Start: 2017-07-14 | End: 2017-08-14

## 2017-08-14 RX ORDER — CYCLOBENZAPRINE HYDROCHLORIDE 7.5 MG/1
TABLET, FILM COATED ORAL
Refills: 0 | Status: DISCONTINUED | COMMUNITY
End: 2017-08-14

## 2017-08-21 NOTE — PATIENT PROFILE ADULT. - NS PRO OT REFERRAL QUES 1 YN
Pediatric Otolaryngology and Facial Plastic Surgery  Dr. Simeon MCKEONspeedy was seen today, 08/21/17,  in the [facility].    Follow up plan: 6 months    Audiogram: Pre-visit audiogram with next clinic visit    Medications: Ciprodex    Labs/Orders: None    Nursing Orders: None    Recommended Surgery: None     Diagnosis:EB, Otitis externa      Simeon Marina MD   Pediatric Otolaryngology and Facial Plastic Surgery   Department of Otolaryngology   [facility]   Clinic [phone]    Olivia Kellogg RN   Patient Care Coordinator   Phone [phone]   Fax [phone]    Romy Coats   Perioperative Coordinator/Surgical Scheduling   Phone [phone]   Fax [phone]         no

## 2017-09-25 ENCOUNTER — APPOINTMENT (OUTPATIENT)
Dept: ORTHOPEDIC SURGERY | Facility: CLINIC | Age: 72
End: 2017-09-25
Payer: MEDICARE

## 2017-09-25 VITALS — WEIGHT: 135 LBS | HEIGHT: 65 IN | BODY MASS INDEX: 22.49 KG/M2

## 2017-09-25 PROCEDURE — 99024 POSTOP FOLLOW-UP VISIT: CPT

## 2017-09-25 PROCEDURE — 72110 X-RAY EXAM L-2 SPINE 4/>VWS: CPT

## 2017-11-08 ENCOUNTER — RESULT REVIEW (OUTPATIENT)
Age: 72
End: 2017-11-08

## 2017-12-18 ENCOUNTER — APPOINTMENT (OUTPATIENT)
Dept: ORTHOPEDIC SURGERY | Facility: CLINIC | Age: 72
End: 2017-12-18
Payer: MEDICARE

## 2017-12-18 VITALS — WEIGHT: 135 LBS | HEIGHT: 65 IN | BODY MASS INDEX: 22.49 KG/M2

## 2017-12-18 PROCEDURE — 99213 OFFICE O/P EST LOW 20 MIN: CPT

## 2017-12-18 PROCEDURE — 72110 X-RAY EXAM L-2 SPINE 4/>VWS: CPT

## 2018-07-18 ENCOUNTER — CLINICAL ADVICE (OUTPATIENT)
Age: 73
End: 2018-07-18

## 2018-09-17 PROBLEM — F40.240 CLAUSTROPHOBIA: Chronic | Status: ACTIVE | Noted: 2017-06-15

## 2018-09-17 PROBLEM — G43.909 MIGRAINE, UNSPECIFIED, NOT INTRACTABLE, WITHOUT STATUS MIGRAINOSUS: Chronic | Status: ACTIVE | Noted: 2017-06-15

## 2018-09-17 PROBLEM — G47.30 SLEEP APNEA, UNSPECIFIED: Chronic | Status: ACTIVE | Noted: 2017-06-15

## 2018-09-17 PROBLEM — M48.06 SPINAL STENOSIS, LUMBAR REGION: Chronic | Status: ACTIVE | Noted: 2017-06-15

## 2018-10-01 ENCOUNTER — APPOINTMENT (OUTPATIENT)
Dept: ORTHOPEDIC SURGERY | Facility: CLINIC | Age: 73
End: 2018-10-01
Payer: MEDICARE

## 2018-10-01 VITALS — WEIGHT: 135 LBS | BODY MASS INDEX: 22.49 KG/M2 | HEIGHT: 65 IN

## 2018-10-01 DIAGNOSIS — M43.17 SPONDYLOLISTHESIS, LUMBOSACRAL REGION: ICD-10-CM

## 2018-10-01 DIAGNOSIS — M54.12 RADICULOPATHY, CERVICAL REGION: ICD-10-CM

## 2018-10-01 PROCEDURE — 72110 X-RAY EXAM L-2 SPINE 4/>VWS: CPT

## 2018-10-01 PROCEDURE — 99213 OFFICE O/P EST LOW 20 MIN: CPT

## 2018-10-01 PROCEDURE — 72040 X-RAY EXAM NECK SPINE 2-3 VW: CPT

## 2018-12-21 ENCOUNTER — APPOINTMENT (OUTPATIENT)
Dept: ORTHOPEDIC SURGERY | Facility: CLINIC | Age: 73
End: 2018-12-21
Payer: MEDICARE

## 2018-12-21 VITALS — BODY MASS INDEX: 25.66 KG/M2 | WEIGHT: 154 LBS | HEIGHT: 65 IN

## 2018-12-21 PROCEDURE — 99213 OFFICE O/P EST LOW 20 MIN: CPT

## 2019-03-04 ENCOUNTER — APPOINTMENT (OUTPATIENT)
Dept: ORTHOPEDIC SURGERY | Facility: CLINIC | Age: 74
End: 2019-03-04

## 2019-03-11 ENCOUNTER — APPOINTMENT (OUTPATIENT)
Dept: ORTHOPEDIC SURGERY | Facility: CLINIC | Age: 74
End: 2019-03-11
Payer: MEDICARE

## 2019-03-11 VITALS — WEIGHT: 154 LBS | HEIGHT: 65 IN | BODY MASS INDEX: 25.66 KG/M2

## 2019-03-11 DIAGNOSIS — M76.892 OTHER SPECIFIED ENTHESOPATHIES OF LEFT LOWER LIMB, EXCLUDING FOOT: ICD-10-CM

## 2019-03-11 DIAGNOSIS — M47.817 SPONDYLOSIS W/OUT MYELOPATHY OR RADICULOPATHY, LUMBOSACRAL REGION: ICD-10-CM

## 2019-03-11 DIAGNOSIS — M47.812 SPONDYLOSIS W/OUT MYELOPATHY OR RADICULOPATHY, CERVICAL REGION: ICD-10-CM

## 2019-03-11 PROCEDURE — 99213 OFFICE O/P EST LOW 20 MIN: CPT

## 2019-07-05 NOTE — H&P PST ADULT - CIGARETTES, NUMBER OF YRS
Pt presents with c/o ams. Onset approx 2hrs. Family friend reports pt being outside all day for picnic. Pt a&o x4. resp even. ls clear. Skin tan,warm, dry. Afebrile. All neuro checks intact. Pupils 4R. Speech clear. Pt denies etoh, drug use today. Denies HA. Pt states \"I just don't feel right. \"
2

## 2019-07-15 ENCOUNTER — APPOINTMENT (OUTPATIENT)
Dept: ORTHOPEDIC SURGERY | Facility: CLINIC | Age: 74
End: 2019-07-15
Payer: MEDICARE

## 2019-07-15 VITALS — DIASTOLIC BLOOD PRESSURE: 82 MMHG | HEART RATE: 71 BPM | SYSTOLIC BLOOD PRESSURE: 143 MMHG

## 2019-07-15 DIAGNOSIS — M25.552 PAIN IN LEFT HIP: ICD-10-CM

## 2019-07-15 PROCEDURE — 99213 OFFICE O/P EST LOW 20 MIN: CPT

## 2019-07-15 PROCEDURE — 73502 X-RAY EXAM HIP UNI 2-3 VIEWS: CPT | Mod: LT

## 2019-07-15 PROCEDURE — 73562 X-RAY EXAM OF KNEE 3: CPT | Mod: LT

## 2019-07-15 NOTE — DISCUSSION/SUMMARY
[de-identified] : Options discussed. Her left hip and knee/leg pain likely from her lower back. She is requesting physical therapy. Recommend continue followup with Dr. Pate as needed. All questions answered she expresses understanding

## 2019-07-15 NOTE — PHYSICAL EXAM
[de-identified] : General Exam\par \par Well developed, well nourished\par No apparent distress\par Oriented to person, place, and time\par Mood: Normal\par Affect: Normal\par Balance and coordination: Normal\par Gait: Normal\par \par Left hip/Lumbar exam\par \par Skin: Clean/dry and intact\par Inspection: No obvious deformity, no swelling, no ecchymosis.\par Tenderness: no tenderness over greater trochanter/glut medius insertion. No tenderness pubic symphysis, pubic tubercle, hip flexors. No ttp ischial tuberosity or buttock. No ttp over the ASIS/Illiac crest.\par ROM: 0-120°. Internal rotation 30 external rotation 70\par Painful ROM: +\par Additional tests: No pain with circumduction negative impingement test at 90° mildly positive impingement test at 60° + Alek negative Stinchfield\par Strength: 5/5 hip flexion/ADD/ABD/Q/H/TA/GS/EHL\par Neuro: Sensation in tact to light touch throughout in dp/sp/tib/kelsie/saph distributions\par Pulses: 2+ DP/PT pulses\par \par left knee exam\par \par Skin: Clean, dry, intact\par Inspection: No obvious malalignment, no masses, no swelling, no effusion.\par Tenderness: no MJLT. No LJLT. No tenderness over the medial and lateral patella facets. No ttp medial/lateral epicondyle, patella tendon, tibial tubercle, pes anserinus, or proximal fibula.\par ROM: 0 to 130° no pain with deep flexion in both knees\par Stability: Stable to varus, valgus, lachman testing. Negative anterior/posterior drawer.\par Additional tests: Negative McMurrays test, Negative patellar grind test. \par Strength: 5/5 Q/H/TA/GS/EHL, no atrophy\par Neuro: In tact to light touch throughout in dp/sp/tib/kelsie/saph nerve districutions, DTR's normal\par Pulses: 2+ DP/PT pulses.\par  [de-identified] : AP pelvis/L hip.  Preliminary report- no acute fx noted, intact appearing lumbar hardware\par 3 views L knee.  Preliminary report- no acute fx/dislocation, no significant OA. \par

## 2019-07-15 NOTE — HISTORY OF PRESENT ILLNESS
[de-identified] : 73-year-old female presents complaining of lower back, left hip and knee pain. Her left hip pain started approximately 2 weeks ago. She has pain localized to her groin worse with ambulation. No specific injury. She is being treated for lower back by Dr. Pate. She feels a soreness in her left lower leg with numbness.\par \par The patient's past medical history, past surgical history, medications, allergies, and social history were reviewed by me today with the patient and documented accordingly. In addition, the patient's family history, which is noncontributory to this visit, was also reviewed.\par

## 2019-07-17 PROBLEM — M25.552 LEFT HIP PAIN: Status: ACTIVE | Noted: 2019-07-15

## 2019-08-01 ENCOUNTER — APPOINTMENT (OUTPATIENT)
Dept: ORTHOPEDIC SURGERY | Facility: CLINIC | Age: 74
End: 2019-08-01

## 2019-08-12 ENCOUNTER — APPOINTMENT (OUTPATIENT)
Dept: ORTHOPEDIC SURGERY | Facility: CLINIC | Age: 74
End: 2019-08-12
Payer: MEDICARE

## 2019-08-12 VITALS — WEIGHT: 154 LBS | BODY MASS INDEX: 25.66 KG/M2 | HEIGHT: 65 IN

## 2019-08-12 DIAGNOSIS — R10.32 LEFT LOWER QUADRANT PAIN: ICD-10-CM

## 2019-08-12 PROCEDURE — 72110 X-RAY EXAM L-2 SPINE 4/>VWS: CPT

## 2019-08-12 PROCEDURE — 99213 OFFICE O/P EST LOW 20 MIN: CPT

## 2019-08-14 ENCOUNTER — APPOINTMENT (OUTPATIENT)
Dept: MRI IMAGING | Facility: CLINIC | Age: 74
End: 2019-08-14

## 2019-08-15 ENCOUNTER — FORM ENCOUNTER (OUTPATIENT)
Age: 74
End: 2019-08-15

## 2019-08-16 ENCOUNTER — OUTPATIENT (OUTPATIENT)
Dept: OUTPATIENT SERVICES | Facility: HOSPITAL | Age: 74
LOS: 1 days | End: 2019-08-16
Payer: MEDICARE

## 2019-08-16 ENCOUNTER — APPOINTMENT (OUTPATIENT)
Dept: MRI IMAGING | Facility: IMAGING CENTER | Age: 74
End: 2019-08-16
Payer: MEDICARE

## 2019-08-16 DIAGNOSIS — Z90.13 ACQUIRED ABSENCE OF BILATERAL BREASTS AND NIPPLES: Chronic | ICD-10-CM

## 2019-08-16 DIAGNOSIS — Z98.890 OTHER SPECIFIED POSTPROCEDURAL STATES: Chronic | ICD-10-CM

## 2019-08-16 DIAGNOSIS — R10.32 LEFT LOWER QUADRANT PAIN: ICD-10-CM

## 2019-08-16 PROCEDURE — 73721 MRI JNT OF LWR EXTRE W/O DYE: CPT | Mod: 26,LT

## 2019-08-16 PROCEDURE — 73721 MRI JNT OF LWR EXTRE W/O DYE: CPT

## 2019-08-19 ENCOUNTER — RESULT REVIEW (OUTPATIENT)
Age: 74
End: 2019-08-19

## 2019-08-20 ENCOUNTER — APPOINTMENT (OUTPATIENT)
Dept: ORTHOPEDIC SURGERY | Facility: CLINIC | Age: 74
End: 2019-08-20
Payer: MEDICARE

## 2019-08-20 DIAGNOSIS — M16.12 UNILATERAL PRIMARY OSTEOARTHRITIS, LEFT HIP: ICD-10-CM

## 2019-08-20 PROCEDURE — 99213 OFFICE O/P EST LOW 20 MIN: CPT

## 2019-08-20 RX ORDER — CELECOXIB 100 MG/1
100 CAPSULE ORAL
Qty: 60 | Refills: 2 | Status: ACTIVE | COMMUNITY
Start: 2019-08-20 | End: 1900-01-01

## 2019-08-20 RX ORDER — CELECOXIB 100 MG/1
100 CAPSULE ORAL
Qty: 60 | Refills: 1 | Status: ACTIVE | COMMUNITY
Start: 2019-08-20 | End: 1900-01-01

## 2019-08-20 NOTE — DISCUSSION/SUMMARY
[de-identified] : This patient has left hip osteoarthritis with severe proximal femur edema which may represent transient osteoporosis of the hip versus normal bone edema from osteonecrosis or osteoarthritis.  I had a discussion with the patient, who is a candidate for left total hip replacement. Risks, benefits and alternatives were discussed. At this point, the patient wants to hold off as the pain is not quite severe enough. I gave them a book on left total hip replacements and my contact card. If they want to schedule in the future, then they will come in and we will have a full discussion.  In the meantime I prescribed a course of Celebrex and recommend that she continue with physical therapy.  She is also recommended to decrease her weightbearing on that side and to consider using a cane or walker.\par

## 2019-08-20 NOTE — HISTORY OF PRESENT ILLNESS
[de-identified] : This is very nice 73 year old woman experiencing left hip pain x 2 years, which is moderate to severe in intensity.  The pain goes into the groin.  Pain flared even worse over the last 3 months.  The pain is exacerbated by standing long periods.  Medications she has tried include: Tylenol which has provided some relief.  She has tried physical therapy with some relief.  She has not been using a cane / walker / wheelchair.  She has not had a prior injection.  She has numbness and tingling in the extremity since the spine decompression in the calf and toes.  The pain substantially limits activities of daily living. Walking tolerance is reduced.  She has seen Dr. Pate in the past and has undergone prior L3, L4, L5 Diskectomies 6/29/17.  She saw Dr. Pate recently and he recommended MRI which she did.  \par \par \par

## 2019-08-20 NOTE — PHYSICAL EXAM
[de-identified] : Patient is well nourished, well-developed, in no acute distress, with appropriate mood and affect. The patient is oriented to time, place, and person. Gait evaluation reveals a limp. There is no inguinal adenopathy. Examination of the contralateral hip shows normal range of motion, strength, no tenderness, and intact skin. The affected limb is well-perfused, shows a grossly normal motor and sensory examination. Examination of the hip shows no skin lesions. Hip motion is reduced and causes pain. Leg lengths are approximately short on the left by 0.5 cm.  Fadir and Alek positive.  Stinchfield test is positive. Both hips are stable and muscle strength is normal. 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

## 2019-10-02 PROBLEM — Z60.2 PERSON LIVING ALONE: Status: ACTIVE | Noted: 2017-05-18

## 2019-11-18 ENCOUNTER — RX RENEWAL (OUTPATIENT)
Age: 74
End: 2019-11-18

## 2020-04-03 NOTE — DISCHARGE NOTE ADULT - PROVIDER TOKENS
Televisit. Spoke w Kenia watson, patient is doing well, no concerns, but family requests no face to face visits until the Covid pandemic it's over, agrees w phone support, instructed infection control, washing hands, stay away from sick people, on s/s to call RN/Deepti/91Antoine/Ed , verbalize understanding.  COVID-19 SCREENING:  Do you currently have a fever? (T>100.4F or > 38C)  o No- continue telephone assessment    Are you experiencing any Respiratory symptoms? (Cough, SOB,)  o No- continue telephone assessment    Have you been in close contact living in the same house or within 6 feet of a person with confirmed, probable, or suspected Coronavirus?  o No- continue telephone assessment  TRIAGE SCREENING:  Have you been in the hospital within the last two weeks?   o No- continue telephone.  Has there been a change in your condition?   o No-continue telephone.  Any fever in the last two weeks?  o No-continue telephone.  Any increase in SOB in the last two weeks?   o No continue telephone.  Any increase in swelling in your legs or arms within the last two weeks?   No continue telephone.  Any change in appetite in the last two weeks?   o No-continue telephone.  Any change in walking or ability to care for self in the last two weeks?   o No-continue telephone.    Any change in mental status in the last two weeks?   o No-continue telephone.  Any change in pain status in the last two weeks?   o NO-continue telephone.  Are you currently taking any blood thinners?   o No- continue telephone.  Any change in code status or ACP?   o No changes- continue telephone.  Any supplies that you need?  No          Any medications stopped, started, or RX needed?  o No-continue with telephone.      
TOKJANY:'26:MIIS:26'

## 2020-06-19 ENCOUNTER — APPOINTMENT (OUTPATIENT)
Dept: ORTHOPEDIC SURGERY | Facility: CLINIC | Age: 75
End: 2020-06-19
Payer: MEDICARE

## 2020-06-19 VITALS — BODY MASS INDEX: 25.66 KG/M2 | WEIGHT: 154 LBS | HEIGHT: 65 IN

## 2020-06-19 VITALS — TEMPERATURE: 97.8 F

## 2020-06-19 PROCEDURE — 72110 X-RAY EXAM L-2 SPINE 4/>VWS: CPT

## 2020-06-19 PROCEDURE — 99213 OFFICE O/P EST LOW 20 MIN: CPT

## 2020-07-17 ENCOUNTER — OUTPATIENT (OUTPATIENT)
Dept: OUTPATIENT SERVICES | Facility: HOSPITAL | Age: 75
LOS: 1 days | End: 2020-07-17

## 2020-07-17 ENCOUNTER — APPOINTMENT (OUTPATIENT)
Dept: MRI IMAGING | Facility: IMAGING CENTER | Age: 75
End: 2020-07-17

## 2020-07-17 DIAGNOSIS — Z90.13 ACQUIRED ABSENCE OF BILATERAL BREASTS AND NIPPLES: Chronic | ICD-10-CM

## 2020-07-17 DIAGNOSIS — M54.16 RADICULOPATHY, LUMBAR REGION: ICD-10-CM

## 2020-07-17 DIAGNOSIS — Z98.890 OTHER SPECIFIED POSTPROCEDURAL STATES: Chronic | ICD-10-CM

## 2020-07-17 DIAGNOSIS — M47.817 SPONDYLOSIS WITHOUT MYELOPATHY OR RADICULOPATHY, LUMBOSACRAL REGION: ICD-10-CM

## 2020-07-21 ENCOUNTER — APPOINTMENT (OUTPATIENT)
Dept: CT IMAGING | Facility: IMAGING CENTER | Age: 75
End: 2020-07-21
Payer: MEDICARE

## 2020-07-21 ENCOUNTER — OUTPATIENT (OUTPATIENT)
Dept: OUTPATIENT SERVICES | Facility: HOSPITAL | Age: 75
LOS: 1 days | End: 2020-07-21
Payer: MEDICARE

## 2020-07-21 DIAGNOSIS — Z98.890 OTHER SPECIFIED POSTPROCEDURAL STATES: Chronic | ICD-10-CM

## 2020-07-21 DIAGNOSIS — Z00.8 ENCOUNTER FOR OTHER GENERAL EXAMINATION: ICD-10-CM

## 2020-07-21 DIAGNOSIS — Z90.13 ACQUIRED ABSENCE OF BILATERAL BREASTS AND NIPPLES: Chronic | ICD-10-CM

## 2020-07-21 PROCEDURE — 70450 CT HEAD/BRAIN W/O DYE: CPT

## 2020-07-21 PROCEDURE — 70450 CT HEAD/BRAIN W/O DYE: CPT | Mod: 26

## 2020-07-31 RX ORDER — DIAZEPAM 5 MG/1
5 TABLET ORAL
Qty: 4 | Refills: 0 | Status: ACTIVE | COMMUNITY
Start: 2020-07-31 | End: 1900-01-01

## 2020-08-05 ENCOUNTER — RESULT REVIEW (OUTPATIENT)
Age: 75
End: 2020-08-05

## 2020-08-05 ENCOUNTER — APPOINTMENT (OUTPATIENT)
Dept: MRI IMAGING | Facility: IMAGING CENTER | Age: 75
End: 2020-08-05
Payer: MEDICARE

## 2020-08-05 ENCOUNTER — OUTPATIENT (OUTPATIENT)
Dept: OUTPATIENT SERVICES | Facility: HOSPITAL | Age: 75
LOS: 1 days | End: 2020-08-05
Payer: MEDICARE

## 2020-08-05 DIAGNOSIS — Z90.13 ACQUIRED ABSENCE OF BILATERAL BREASTS AND NIPPLES: Chronic | ICD-10-CM

## 2020-08-05 DIAGNOSIS — M47.817 SPONDYLOSIS WITHOUT MYELOPATHY OR RADICULOPATHY, LUMBOSACRAL REGION: ICD-10-CM

## 2020-08-05 DIAGNOSIS — M54.16 RADICULOPATHY, LUMBAR REGION: ICD-10-CM

## 2020-08-05 DIAGNOSIS — Z98.890 OTHER SPECIFIED POSTPROCEDURAL STATES: Chronic | ICD-10-CM

## 2020-08-05 PROCEDURE — A9585: CPT

## 2020-08-05 PROCEDURE — 72158 MRI LUMBAR SPINE W/O & W/DYE: CPT

## 2020-08-05 PROCEDURE — 72158 MRI LUMBAR SPINE W/O & W/DYE: CPT | Mod: 26

## 2020-08-12 ENCOUNTER — APPOINTMENT (OUTPATIENT)
Dept: PHYSICAL MEDICINE AND REHAB | Facility: CLINIC | Age: 75
End: 2020-08-12
Payer: MEDICARE

## 2020-08-12 VITALS
SYSTOLIC BLOOD PRESSURE: 123 MMHG | HEART RATE: 70 BPM | OXYGEN SATURATION: 92 % | DIASTOLIC BLOOD PRESSURE: 75 MMHG | TEMPERATURE: 97.4 F

## 2020-08-12 DIAGNOSIS — Z87.39 PERSONAL HISTORY OF OTHER DISEASES OF THE MUSCULOSKELETAL SYSTEM AND CONNECTIVE TISSUE: ICD-10-CM

## 2020-08-12 PROCEDURE — 99204 OFFICE O/P NEW MOD 45 MIN: CPT

## 2020-08-13 PROBLEM — Z87.39 HISTORY OF ARTHRITIS: Status: RESOLVED | Noted: 2020-08-12 | Resolved: 2020-08-13

## 2020-08-13 PROBLEM — Z87.39 HISTORY OF HERNIATED INTERVERTEBRAL DISC: Status: RESOLVED | Noted: 2020-08-12 | Resolved: 2020-08-13

## 2020-08-13 NOTE — PHYSICAL EXAM
[FreeTextEntry1] : Constitutional: Well-nourished, well-developed, No acute distress\par Respiratory: Good respiratory effort, no SOB\par Psychiatric: Pleasant and normal affect, alert and oriented x3\par Skin: Clean dry and intact B/L UE/LE\par Musculoskeletal: normal except where as noted in regional exam\par \par LUMBAR SPINE:\par Inspection reveals no swelling or erythema\par Range of motion testing shows full flexion without pain, extension to 20° without pain\par Tenderness to palpation left lumbar paraspinal muscles. \par Seated slump test negative \par Straight leg raise negative\par \par HIPS/PELVIS -\par Symmetric in standing and lying\par Hip maneuvers:\par Range of motion shows full without pain \par MARVA negative\par Log roll negative\par Sacroiliac maneuvers: TTP of left SI joint, left buttock\par \par NEURO - Normal bulk and tone \par LE strength - 5]/5 including hip flexion, knee flexion, knee extension, ankle dorsiflexion, ankle plantarflexion \par Toe-walking and heel-walking intact\par Sensation - intact to light touch in bilateral lower extremities.  \par GAIT - Normal base, normal stride length, mildly antalgic \par Stands from seated position\par \par

## 2020-08-13 NOTE — REVIEW OF SYSTEMS
[Joint Stiffness] : joint stiffness [Joint Pain] : joint pain [Negative] : Psychiatric [FreeTextEntry9] : +low back pain [de-identified] : +numbness and tinging radiating to the lower extremities

## 2020-08-13 NOTE — HISTORY OF PRESENT ILLNESS
[FreeTextEntry1] : Pt is a 73 y/o F s/p lumbar spinal fusion L3-L5 in 2019, left hip replacement 11/2019 presenting with 6 month history of low back pain and radiation into her left leg. She was seen by Dr. Pate, who sent her for MRI of lumbar spine. Pt has been having a knot-like pain in her left lower back and when she lies down on a couch or in bed she feels numbness and tingling go down both her legs, opposite the way she leans. The pain is worse on the left side. The pain is described as an achy pain, and rates it as a 4/10 and is constantly present. Pt states that when she lies on her stomach the pain is improved compared to when she is on her side. She does feel some pain in her midback with walking and sitting in one place for a long time. She feels that she is limping while walking.  Once in a while she has taken Aleve for pain relief, but does not take it often. Patient denies new weakness, numbness or paresthesia.  Denies bowel/bladder dysfunction, fevers, chills, weight loss, night pain, or night sweats.\par

## 2020-08-13 NOTE — DATA REVIEWED
[MRI] : MRI [FreeTextEntry1] : MRI Lumbar Spine w/wo IV Cont:\par IMPRESSION:\par 1.  Status post laminectomies from L3-4 through L5-S1. Posterior fusion hardware from L3 through L5. Detailed evaluation of hardware is limited by susceptibility artifact. CT can be performed for more detailed evaluation of the hardware.\par 2.  L2-3: Significant adjacent segment syndrome with disc bulging and superimposed right lateral recess/foraminal disc protrusion. Resulting in severe central canal stenosis and mild right greater than left foraminal narrowing. Suspected compression the descending right L3 nerve root by disc osteophyte complex in the right lateral recess.\par 3.  Additional multilevel spondylosis as described above.\par \par

## 2020-08-13 NOTE — ASSESSMENT
[FreeTextEntry1] : Pt is a 75 y/o F s/p lumbar spinal fusion L3-L5 in 2019, left hip replacement 11/2019 presenting with 6 month history of low back pain and radiation into her legs. Based on her MRI, recommend:\par \par 1. -I recommend that patient undergo left L2-L3 TFESI.  Will submit for insurance approval.  Once insurance approval has been obtained, patient will be contacted to schedule injection at out-patient ambulatory center.  Covid-19 testing referral provided and patient asked to undergo testing per unit protocol.\par 2. Risk, benefits, and adverse effects discussed with patient. Procedure explained to patient and all questions and concerns addressed\par \par Vasile Henson MD\par Spine and Sports Medicine\par \par [employer]\par At [facility]\par \par

## 2020-08-16 ENCOUNTER — LABORATORY RESULT (OUTPATIENT)
Age: 75
End: 2020-08-16

## 2020-08-16 ENCOUNTER — APPOINTMENT (OUTPATIENT)
Dept: DISASTER EMERGENCY | Facility: CLINIC | Age: 75
End: 2020-08-16

## 2020-08-19 DIAGNOSIS — F41.9 ANXIETY DISORDER, UNSPECIFIED: ICD-10-CM

## 2020-08-19 RX ORDER — DIAZEPAM 5 MG/1
5 TABLET ORAL
Qty: 2 | Refills: 0 | Status: ACTIVE | COMMUNITY
Start: 2020-08-19 | End: 1900-01-01

## 2020-08-21 ENCOUNTER — APPOINTMENT (OUTPATIENT)
Dept: ORTHOPEDIC SURGERY | Facility: CLINIC | Age: 75
End: 2020-08-21

## 2020-08-21 ENCOUNTER — OUTPATIENT (OUTPATIENT)
Dept: OUTPATIENT SERVICES | Facility: HOSPITAL | Age: 75
LOS: 1 days | End: 2020-08-21
Payer: MEDICARE

## 2020-08-21 ENCOUNTER — APPOINTMENT (OUTPATIENT)
Dept: PHYSICAL MEDICINE AND REHAB | Facility: CLINIC | Age: 75
End: 2020-08-21

## 2020-08-21 DIAGNOSIS — M54.16 RADICULOPATHY, LUMBAR REGION: ICD-10-CM

## 2020-08-21 DIAGNOSIS — Z98.890 OTHER SPECIFIED POSTPROCEDURAL STATES: Chronic | ICD-10-CM

## 2020-08-21 DIAGNOSIS — Z90.13 ACQUIRED ABSENCE OF BILATERAL BREASTS AND NIPPLES: Chronic | ICD-10-CM

## 2020-08-21 PROCEDURE — 64483 NJX AA&/STRD TFRM EPI L/S 1: CPT | Mod: LT

## 2020-08-21 PROCEDURE — 64483 NJX AA&/STRD TFRM EPI L/S 1: CPT

## 2020-09-03 ENCOUNTER — APPOINTMENT (OUTPATIENT)
Dept: PHYSICAL MEDICINE AND REHAB | Facility: CLINIC | Age: 75
End: 2020-09-03
Payer: MEDICARE

## 2020-09-03 VITALS
SYSTOLIC BLOOD PRESSURE: 132 MMHG | OXYGEN SATURATION: 94 % | TEMPERATURE: 96.8 F | HEART RATE: 69 BPM | DIASTOLIC BLOOD PRESSURE: 74 MMHG

## 2020-09-03 DIAGNOSIS — Z01.818 ENCOUNTER FOR OTHER PREPROCEDURAL EXAMINATION: ICD-10-CM

## 2020-09-03 PROCEDURE — 99214 OFFICE O/P EST MOD 30 MIN: CPT

## 2020-09-03 RX ORDER — DIAZEPAM 5 MG/1
5 TABLET ORAL
Qty: 2 | Refills: 0 | Status: ACTIVE | COMMUNITY
Start: 2020-09-03 | End: 1900-01-01

## 2020-09-06 NOTE — HISTORY OF PRESENT ILLNESS
[FreeTextEntry1] : 9/3/2020\par 75 y/o F s/p lumbar spinal fusion L3-L5 in 2019, left hip replacement 11/2019 presenting for f/u 2/2 low back pain and radiation into her left leg, now s/p TFESI on 8/21. Patient notes improvement in LBP. Still with knot-like pain in left lower back and limping, as well as pain that radiates down the lateral aspect of her legs. However, pain today is improved to 5-6/10, as compared to 9-10/10 during initial visit. Pain is described as dull and achy, worsened by certain laying in certain positions. During today's visit, states she has noticed intermittent, sharp pain in her buttocks that radiates to her right knee, described as achy, which she has never had before. Was associated with long drive, but currently asymptomatic. Patient denies new weakness, numbness or paresthesia.  Denies bowel/bladder dysfunction, fevers, chills, weight loss, night pain, or night sweats.\par \par \par 8/12/2020\par Pt is a 75 y/o F s/p lumbar spinal fusion L3-L5 in 2019, left hip replacement 11/2019 presenting with 6 month history of low back pain and radiation into her left leg. She was seen by Dr. Pate, who sent her for MRI of lumbar spine. Pt has been having a knot-like pain in her left lower back and when she lies down on a couch or in bed she feels numbness and tingling go down both her legs, opposite the way she leans. The pain is worse on the left side. The pain is described as an achy pain, and rates it as a 4/10 and is constantly present. Pt states that when she lies on her stomach the pain is improved compared to when she is on her side. She does feel some pain in her midback with walking and sitting in one place for a long time. She feels that she is limping while walking. Once in a while she has taken Aleve for pain relief, but does not take it often. Patient denies new weakness, numbness or paresthesia. Denies bowel/bladder dysfunction, fevers, chills, weight loss, night pain, or night sweats.

## 2020-09-06 NOTE — PHYSICAL EXAM
[FreeTextEntry1] : Constitutional: Well-nourished, well-developed, No acute distress\par Respiratory: Good respiratory effort, no SOB\par Psychiatric: Pleasant and normal affect, alert and oriented x3\par Skin: Clean dry and intact B/L UE/LE\par Musculoskeletal: normal except where as noted in regional exam\par \par LUMBAR SPINE:\par Inspection reveals no swelling or erythema\par Range of motion testing shows full flexion without pain, extension to 20° without pain\par Tenderness to palpation left lumbar paraspinal muscles but significantly improved from previous exam\par Seated slump test negative \par Straight leg raise negative\par \par HIPS/PELVIS -\par Symmetric in standing and lying\par Hip maneuvers:\par Range of motion shows full without pain \par MARVA negative\par Log roll negative\par Sacroiliac maneuvers: TTP of left SI joint, left buttock\par \par NEURO - Normal bulk and tone \par LE strength - 5]/5 including hip flexion, knee flexion, knee extension, ankle dorsiflexion, ankle plantarflexion \par Toe-walking and heel-walking intact\par Sensation - intact to light touch in bilateral lower extremities.  \par GAIT - Normal base, normal stride length, mildly antalgic \par Stands from seated position\par \par

## 2020-09-06 NOTE — ASSESSMENT
[FreeTextEntry1] : Pt is a 75 y/o F s/p lumbar spinal fusion L3-L5 in 2019, left hip replacement 11/2019 with 6 month history of low back pain and radiation into her legs on initial evaluation, now s/p TFESI on 8/21. \par \par On follow up visit today, notes improvement in symptoms of lower back and left LE. However, symptoms still present and patient would likely benefit from second injection.\par \par 1. Recommend that patient undergo left L2-L3 TFESI again. Risks and benefits reviewed.  Will submit for insurance approval. Once insurance approval has been obtained, patient will be contacted to schedule injection at out-patient ambulatory center. Covid-19 testing referral provided and patient asked to undergo testing per unit protocol.\par 2. Risk, benefits, and adverse effects discussed with patient. Procedure explained to patient and all questions and concerns addressed\par 3.  Patient with significant anxiety prior to and during injection.  Will prescribe low dose valium 5 mg PO prior to injection.  [place] I-stop checked and no signs of abuse.\par \par Vasile Henson MD\par Spine and Sports Medicine\par \par [employer]\par At [facility]\par \par

## 2020-09-21 ENCOUNTER — APPOINTMENT (OUTPATIENT)
Dept: DISASTER EMERGENCY | Facility: CLINIC | Age: 75
End: 2020-09-21

## 2020-09-22 LAB — SARS-COV-2 N GENE NPH QL NAA+PROBE: NOT DETECTED

## 2020-09-25 ENCOUNTER — APPOINTMENT (OUTPATIENT)
Dept: PHYSICAL MEDICINE AND REHAB | Facility: CLINIC | Age: 75
End: 2020-09-25

## 2020-09-25 ENCOUNTER — OUTPATIENT (OUTPATIENT)
Dept: OUTPATIENT SERVICES | Facility: HOSPITAL | Age: 75
LOS: 1 days | End: 2020-09-25
Payer: MEDICARE

## 2020-09-25 DIAGNOSIS — M54.16 RADICULOPATHY, LUMBAR REGION: ICD-10-CM

## 2020-09-25 DIAGNOSIS — Z98.890 OTHER SPECIFIED POSTPROCEDURAL STATES: Chronic | ICD-10-CM

## 2020-09-25 DIAGNOSIS — Z90.13 ACQUIRED ABSENCE OF BILATERAL BREASTS AND NIPPLES: Chronic | ICD-10-CM

## 2020-09-25 PROCEDURE — 64483 NJX AA&/STRD TFRM EPI L/S 1: CPT

## 2020-09-25 PROCEDURE — 64483 NJX AA&/STRD TFRM EPI L/S 1: CPT | Mod: LT

## 2020-09-29 ENCOUNTER — OUTPATIENT (OUTPATIENT)
Dept: OUTPATIENT SERVICES | Facility: HOSPITAL | Age: 75
LOS: 1 days | End: 2020-09-29
Payer: MEDICARE

## 2020-09-29 ENCOUNTER — APPOINTMENT (OUTPATIENT)
Dept: RADIOLOGY | Facility: IMAGING CENTER | Age: 75
End: 2020-09-29
Payer: MEDICARE

## 2020-09-29 ENCOUNTER — RESULT REVIEW (OUTPATIENT)
Age: 75
End: 2020-09-29

## 2020-09-29 DIAGNOSIS — Z98.890 OTHER SPECIFIED POSTPROCEDURAL STATES: Chronic | ICD-10-CM

## 2020-09-29 DIAGNOSIS — Z90.13 ACQUIRED ABSENCE OF BILATERAL BREASTS AND NIPPLES: Chronic | ICD-10-CM

## 2020-09-29 DIAGNOSIS — M25.561 PAIN IN RIGHT KNEE: ICD-10-CM

## 2020-09-29 PROCEDURE — 73564 X-RAY EXAM KNEE 4 OR MORE: CPT | Mod: 26,RT

## 2020-09-29 PROCEDURE — 73564 X-RAY EXAM KNEE 4 OR MORE: CPT

## 2020-10-12 ENCOUNTER — APPOINTMENT (OUTPATIENT)
Dept: PHYSICAL MEDICINE AND REHAB | Facility: CLINIC | Age: 75
End: 2020-10-12

## 2020-10-21 ENCOUNTER — APPOINTMENT (OUTPATIENT)
Dept: PHYSICAL MEDICINE AND REHAB | Facility: CLINIC | Age: 75
End: 2020-10-21
Payer: MEDICARE

## 2020-10-21 VITALS
SYSTOLIC BLOOD PRESSURE: 109 MMHG | DIASTOLIC BLOOD PRESSURE: 72 MMHG | TEMPERATURE: 97.4 F | OXYGEN SATURATION: 97 % | HEART RATE: 73 BPM | BODY MASS INDEX: 25.66 KG/M2 | HEIGHT: 65 IN | WEIGHT: 154 LBS

## 2020-10-21 VITALS
HEART RATE: 68 BPM | DIASTOLIC BLOOD PRESSURE: 91 MMHG | OXYGEN SATURATION: 99 % | TEMPERATURE: 97.14 F | SYSTOLIC BLOOD PRESSURE: 183 MMHG

## 2020-10-21 DIAGNOSIS — M25.562 PAIN IN LEFT KNEE: ICD-10-CM

## 2020-10-21 PROCEDURE — 20611 DRAIN/INJ JOINT/BURSA W/US: CPT | Mod: RT

## 2020-10-21 PROCEDURE — 99214 OFFICE O/P EST MOD 30 MIN: CPT | Mod: 25

## 2020-10-22 ENCOUNTER — OUTPATIENT (OUTPATIENT)
Dept: OUTPATIENT SERVICES | Facility: HOSPITAL | Age: 75
LOS: 1 days | End: 2020-10-22
Payer: MEDICARE

## 2020-10-22 ENCOUNTER — APPOINTMENT (OUTPATIENT)
Dept: RADIOLOGY | Facility: IMAGING CENTER | Age: 75
End: 2020-10-22
Payer: MEDICARE

## 2020-10-22 DIAGNOSIS — M25.562 PAIN IN LEFT KNEE: ICD-10-CM

## 2020-10-22 DIAGNOSIS — Z98.890 OTHER SPECIFIED POSTPROCEDURAL STATES: Chronic | ICD-10-CM

## 2020-10-22 DIAGNOSIS — Z90.13 ACQUIRED ABSENCE OF BILATERAL BREASTS AND NIPPLES: Chronic | ICD-10-CM

## 2020-10-22 PROCEDURE — 73564 X-RAY EXAM KNEE 4 OR MORE: CPT

## 2020-10-22 PROCEDURE — 73564 X-RAY EXAM KNEE 4 OR MORE: CPT | Mod: 26,LT

## 2020-10-25 NOTE — PROCEDURE
[de-identified] : Procedure: Ultrasound guided corticosteroid knee Injection. \par        Side: RIGHT \par        Medical Necessity for ultrasound use: Ultrasound guided injection is medically necessary in order to maintain safety and localize injectate to desired location. Visual guidance of structures otherwise not palpable on examination or identified by landmarks is necessary for injection into this structure. The use of ultrasound helps to visualize nerves, vasculature, tendons, ligaments and other soft tissues which may be infiltrated during the course of interventional injection and needle placement. For this reason it is medically necessary to use ultrasound guidance for the injection performed both to avoid injurying or displacing unintended soft tissue structures as well as to improve accuracy which has been shown to directly increase post treatment symptom improvement and resolution when compared to injections performed without guidance. \par        Patient positioning: supine. \par        Target Identification: The body region was palpated as needed in order to localize the area of maximal tenderness. The overlying skin was marked as necessary . \par        Skin Sterilization: The overlying skin was widely prepped with a chloraprep, which was then allowed to dry for 30 seconds. \par        Probe Sterilization: After the ultrasound probe was cleansed with a PDI wipe, it was sterilized with Chloraprep and sterile ultrasound gel was applied as needed. \par        Ultrasound visualization  was then performed to identify the target and estimate the needle length. \par        Procedure: A 27 gauge 1.5 inch needle was then inserted through the sterilized skin and directed to the target as small amounts of lidocaine were injected through it into the overlying skin, subcutaneous tissue, and the overlying tissue as needed. Next, a 21 gauge 2 inch needle attached by an extension tube to a 20 cc syringe was guided toward an area of anechoic signal in the suprapatellar recess representing a knee joint effusion.  18 cc of serosanguinous fluid was aspirated and discharged.  A 5 cc syringe filled with the injectate listed below was exchanged onto the extension tube and the target structure was then injected with the injectate listed below under direct ultrasound visualization. Aspiration was negative for blood prior to injection. \par        Injectate: 3 cc 0.5% lidocaine and 20 mg kenalog\par        Total volume: 3.5 cc\par        Post Procedure: A Band-Aid was then applied and the patient was advised to leave this on until the next day. The patient tolerated the procedure well and reported significant pain releif following the procedure.\par

## 2020-10-25 NOTE — ASSESSMENT
[FreeTextEntry1] : Pt is a 75 y/o F s/p lumbar spinal fusion L3-L5 in 2019, left hip replacement 11/2019 with 6 month history of low back pain and radiation into her legs on initial evaluation, now s/p 2 x TFESI with almost complete resolution of her low back pain.  Patient reports significant and severe right knee pain. \par \par -XR right knee reviewed\par -US guided right knee aspiration and CSI performed on this visit with significant improvement in her pain\par -Patient reports that she also has left knee pain.  Will order XR left knee on this visit.\par -RTC following XR left knee to review results.\par \par Vasile Henson MD\par Spine and Sports Medicine\par \par [employer]\par At [facility]\par \par

## 2020-10-25 NOTE — HISTORY OF PRESENT ILLNESS
[FreeTextEntry1] : 10/21/20\par 73 yo F who presents for follow up with low back and right knee pain.  Patient reports that low back pain with radiation into left lower extremity that she originally presented with has almost completely resolved following 2 x left L2-L3 TFESI.  Patient continues to have residual numbness in the left calf.  Patient now has prominent pain in the right knee.  Pain has been present for several years but worse over the last few months during which she has been limping from low back pain.  Patient reports associated edema.  XR shows degenerative changes.  No previous injections.  Patient reports that she also has left knee pain and low back pain that is lower near her left buttocks but the right knee pain is most prominent today.  Patient denies new weakness, numbness or paresthesia.  Denies bowel/bladder dysfunction, fevers, chills, weight loss, night pain, or night sweats.\par \par 9/3/2020\par 75 y/o F s/p lumbar spinal fusion L3-L5 in 2019, left hip replacement 11/2019 presenting for f/u 2/2 low back pain and radiation into her left leg, now s/p TFESI on 8/21. Patient notes improvement in LBP. Still with knot-like pain in left lower back and limping, as well as pain that radiates down the lateral aspect of her legs. However, pain today is improved to 5-6/10, as compared to 9-10/10 during initial visit. Pain is described as dull and achy, worsened by certain laying in certain positions. During today's visit, states she has noticed intermittent, sharp pain in her buttocks that radiates to her right knee, described as achy, which she has never had before. Was associated with long drive, but currently asymptomatic. Patient denies new weakness, numbness or paresthesia.  Denies bowel/bladder dysfunction, fevers, chills, weight loss, night pain, or night sweats.\par \par \par 8/12/2020\par Pt is a 75 y/o F s/p lumbar spinal fusion L3-L5 in 2019, left hip replacement 11/2019 presenting with 6 month history of low back pain and radiation into her left leg. She was seen by Dr. Pate, who sent her for MRI of lumbar spine. Pt has been having a knot-like pain in her left lower back and when she lies down on a couch or in bed she feels numbness and tingling go down both her legs, opposite the way she leans. The pain is worse on the left side. The pain is described as an achy pain, and rates it as a 4/10 and is constantly present. Pt states that when she lies on her stomach the pain is improved compared to when she is on her side. She does feel some pain in her midback with walking and sitting in one place for a long time. She feels that she is limping while walking. Once in a while she has taken Aleve for pain relief, but does not take it often. Patient denies new weakness, numbness or paresthesia. Denies bowel/bladder dysfunction, fevers, chills, weight loss, night pain, or night sweats.

## 2020-10-25 NOTE — PHYSICAL EXAM
[FreeTextEntry1] : Constitutional: Well-nourished, well-developed, No acute distress\par Respiratory: Good respiratory effort, no SOB\par Psychiatric: Pleasant and normal affect, alert and oriented x3\par Skin: Clean dry and intact B/L UE/LE\par Musculoskeletal: normal except where as noted in regional exam\par \par LUMBAR SPINE:\par Inspection reveals no swelling or erythema\par Range of motion testing shows full flexion without pain, extension to 20° without pain\par Mild tenderness to palpation left lumbar paraspinal muscles, significantly improved from previous exam\par Seated slump test negative \par Straight leg raise negative\par \par HIPS/PELVIS -\par Symmetric in standing and lying\par Hip maneuvers:\par Range of motion shows full without pain \par MARVA negative\par Log roll negative\par Sacroiliac maneuvers: TTP of left SI joint, left buttock\par \par NEURO - Normal bulk and tone \par LE strength - 5]/5 including hip flexion, knee flexion, knee extension, ankle dorsiflexion, ankle plantarflexion \par Toe-walking and heel-walking intact\par Sensation - intact to light touch in bilateral lower extremities.  \par GAIT - Normal base, normal stride length, mildly antalgic \par Stands from seated position\par \par Right knee: full ROM but with pain on extreme flexion/extension, +edema, no skin changes, normal temperature, +crepitus, tenderness to medial joint line, neg charlotte, neg varus/valgus, neg ant/post drawer\par \par Left knee: full ROM but with pain on extreme flexion/extension, some edema, no skin changes, normal temperature, +crepitus, tenderness to medial joint line, neg charlotte, neg varus/valgus, neg ant/post drawer

## 2020-10-27 ENCOUNTER — APPOINTMENT (OUTPATIENT)
Dept: PHYSICAL MEDICINE AND REHAB | Facility: CLINIC | Age: 75
End: 2020-10-27
Payer: MEDICARE

## 2020-10-27 VITALS — BODY MASS INDEX: 25.66 KG/M2 | WEIGHT: 154 LBS | HEIGHT: 65 IN

## 2020-10-27 DIAGNOSIS — R26.9 UNSPECIFIED ABNORMALITIES OF GAIT AND MOBILITY: ICD-10-CM

## 2020-10-27 PROCEDURE — 99214 OFFICE O/P EST MOD 30 MIN: CPT

## 2020-10-27 NOTE — ASSESSMENT
[FreeTextEntry1] : Pt is a 73 y/o F s/p lumbar spinal fusion L3-L5 in 2019, left hip replacement 11/2019 with 6 month history of low back pain and radiation into her legs on initial evaluation, now s/p 2 x TFESI with almost complete resolution of her low back pain.  Patient also with right knee pain consistent with right knee OA with almost complete resolution following US guided right knee aspiration and CSI on 10/21/20.  Patient now exhibiting gait difficulty likely secondary to deconditioning and impaired gait secondary to pain from lumbar radiculopathy.  Patient also with intermittent left sided buttock pain consistent with left sacroiliitis.\par \par -I recommend that patient undergo left SI joint injection.  Risks and benefits discussed with patient.  Will submit for insurance approval.  Once insurance approval has been obtained, patient will be contacted to schedule injection at out-patient ambulatory center.  Covid-19 referral provided to patient on this visit and patient has been instructed to undergo testing per unit protocol.\par -Start PT/HEP for knee OA, lumbar radiculopathy, sacroiliitis and gait difficulty.\par \par Vasile Henson MD\par Spine and Sports Medicine\par \par [employer]\par At [facility]\par \par

## 2020-10-27 NOTE — PHYSICAL EXAM
[FreeTextEntry1] : Constitutional: Well-nourished, well-developed, No acute distress\par Respiratory: Good respiratory effort, no SOB\par Psychiatric: Pleasant and normal affect, alert and oriented x3\par Skin: Clean dry and intact B/L UE/LE\par Musculoskeletal: normal except where as noted in regional exam\par \par LUMBAR SPINE:\par Inspection reveals no swelling or erythema\par Range of motion testing shows full flexion without pain, extension to 20° without pain\par no tenderness along lumbar paraspinals or facet joints\par Seated slump test negative \par Straight leg raise negative\par \par HIPS/PELVIS -\par Symmetric in standing and lying\par Hip maneuvers:\par Range of motion shows full without pain \par MARVA positive left\par Log roll negative\par Sacroiliac maneuvers: TTP of left SI joint, left buttock\par \par NEURO - Normal bulk and tone \par LE strength - 5]/5 including hip flexion, knee flexion, knee extension, ankle dorsiflexion, ankle plantarflexion \par Toe-walking and heel-walking intact\par Sensation - intact to light touch in bilateral lower extremities.  \par GAIT - Normal base, normal stride length, mildly antalgic \par Stands from seated position\par \par Right knee: full ROM but with pain on extreme flexion/extension, minimal edema, no skin changes, normal temperature, +crepitus, mild tenderness to medial joint line, neg charlotte, neg varus/valgus, neg ant/post drawer\par \par Left knee: full ROM but with pain on extreme flexion/extension, some edema, no skin changes, normal temperature, +crepitus, tenderness to medial joint line, neg charlotte, neg varus/valgus, neg ant/post drawer

## 2020-10-27 NOTE — HISTORY OF PRESENT ILLNESS
[FreeTextEntry1] : 10/27/20\par 75 yo F who presents with for follow up with low back and right knee pain.  Patient reports significant improvement in right knee pain following US guided aspiration and CSI.  Patient reports that pain in the low back flared over the past weekend.  Patient denies inciting event, trauma or fall.  Patient reports that pain is now lower in the left buttock and without radicular features.  Pain is worse with prolonged standing.  Patient reports that overall low back pain has improved but she continues to have difficulty with ambulation and a sensation of tightness in the left hip.  Patient denies new weakness, numbness or paresthesia.  Denies bowel/bladder dysfunction, fevers, chills, weight loss, night pain, or night sweats.\par \par 10/21/20\par 75 yo F who presents for follow up with low back and right knee pain.  Patient reports that low back pain with radiation into left lower extremity that she originally presented with has almost completely resolved following 2 x left L2-L3 TFESI.  Patient continues to have residual numbness in the left calf.  Patient now has prominent pain in the right knee.  Pain has been present for several years but worse over the last few months during which she has been limping from low back pain.  Patient reports associated edema.  XR shows degenerative changes.  No previous injections.  Patient reports that she also has left knee pain and low back pain that is lower near her left buttocks but the right knee pain is most prominent today.  Patient denies new weakness, numbness or paresthesia.  Denies bowel/bladder dysfunction, fevers, chills, weight loss, night pain, or night sweats.\par \par 9/3/2020\par 73 y/o F s/p lumbar spinal fusion L3-L5 in 2019, left hip replacement 11/2019 presenting for f/u 2/2 low back pain and radiation into her left leg, now s/p TFESI on 8/21. Patient notes improvement in LBP. Still with knot-like pain in left lower back and limping, as well as pain that radiates down the lateral aspect of her legs. However, pain today is improved to 5-6/10, as compared to 9-10/10 during initial visit. Pain is described as dull and achy, worsened by certain laying in certain positions. During today's visit, states she has noticed intermittent, sharp pain in her buttocks that radiates to her right knee, described as achy, which she has never had before. Was associated with long drive, but currently asymptomatic. Patient denies new weakness, numbness or paresthesia.  Denies bowel/bladder dysfunction, fevers, chills, weight loss, night pain, or night sweats.\par \par \par 8/12/2020\par Pt is a 73 y/o F s/p lumbar spinal fusion L3-L5 in 2019, left hip replacement 11/2019 presenting with 6 month history of low back pain and radiation into her left leg. She was seen by Dr. Pate, who sent her for MRI of lumbar spine. Pt has been having a knot-like pain in her left lower back and when she lies down on a couch or in bed she feels numbness and tingling go down both her legs, opposite the way she leans. The pain is worse on the left side. The pain is described as an achy pain, and rates it as a 4/10 and is constantly present. Pt states that when she lies on her stomach the pain is improved compared to when she is on her side. She does feel some pain in her midback with walking and sitting in one place for a long time. She feels that she is limping while walking. Once in a while she has taken Aleve for pain relief, but does not take it often. Patient denies new weakness, numbness or paresthesia. Denies bowel/bladder dysfunction, fevers, chills, weight loss, night pain, or night sweats.

## 2020-11-10 ENCOUNTER — LABORATORY RESULT (OUTPATIENT)
Age: 75
End: 2020-11-10

## 2020-11-10 ENCOUNTER — APPOINTMENT (OUTPATIENT)
Dept: DISASTER EMERGENCY | Facility: CLINIC | Age: 75
End: 2020-11-10

## 2020-11-13 ENCOUNTER — APPOINTMENT (OUTPATIENT)
Dept: PHYSICAL MEDICINE AND REHAB | Facility: CLINIC | Age: 75
End: 2020-11-13

## 2020-11-13 ENCOUNTER — OUTPATIENT (OUTPATIENT)
Dept: OUTPATIENT SERVICES | Facility: HOSPITAL | Age: 75
LOS: 1 days | End: 2020-11-13
Payer: MEDICARE

## 2020-11-13 DIAGNOSIS — M46.1 SACROILIITIS, NOT ELSEWHERE CLASSIFIED: ICD-10-CM

## 2020-11-13 DIAGNOSIS — Z98.890 OTHER SPECIFIED POSTPROCEDURAL STATES: Chronic | ICD-10-CM

## 2020-11-13 DIAGNOSIS — Z90.13 ACQUIRED ABSENCE OF BILATERAL BREASTS AND NIPPLES: Chronic | ICD-10-CM

## 2020-11-13 PROCEDURE — 27096 INJECT SACROILIAC JOINT: CPT | Mod: LT

## 2020-11-13 PROCEDURE — G0260: CPT

## 2020-12-03 ENCOUNTER — APPOINTMENT (OUTPATIENT)
Dept: PHYSICAL MEDICINE AND REHAB | Facility: CLINIC | Age: 75
End: 2020-12-03
Payer: MEDICARE

## 2020-12-03 VITALS
SYSTOLIC BLOOD PRESSURE: 129 MMHG | DIASTOLIC BLOOD PRESSURE: 78 MMHG | TEMPERATURE: 97.34 F | HEART RATE: 70 BPM | OXYGEN SATURATION: 95 %

## 2020-12-03 DIAGNOSIS — M46.1 SACROILIITIS, NOT ELSEWHERE CLASSIFIED: ICD-10-CM

## 2020-12-03 PROCEDURE — 99214 OFFICE O/P EST MOD 30 MIN: CPT

## 2020-12-03 RX ORDER — DICLOFENAC SODIUM 1% 10 MG/G
1 GEL TOPICAL
Qty: 1 | Refills: 0 | Status: ACTIVE | COMMUNITY
Start: 2020-12-03 | End: 1900-01-01

## 2020-12-06 RX ORDER — ESCITALOPRAM OXALATE 5 MG/1
5 TABLET ORAL
Qty: 90 | Refills: 0 | Status: ACTIVE | COMMUNITY
Start: 2020-11-23

## 2020-12-06 NOTE — ASSESSMENT
[FreeTextEntry1] : 75 y/o F s/p lumbar spinal fusion L3-L5 in 2019, left hip replacement 11/2019 with 6 month history of low back pain and radiation into her legs on initial evaluation, s/p 2 x TFESI now s/p SIJ injection on the left with almost complete resolution of her low back pain.  Patient also with acute on chronic right foot pain.  Doubt fracture or dislocations but given history of osteoporosis will order XR to rule out stress fracture.\par \par -Xray right foot to r/o stress fracture \par - rx for topical diclofenac sent\par -Start PT/HEP for knee OA, lumbar radiculopathy, sacroiliitis and gait difficulty.\par -RTC following imaging to review results\par \par Vasile Henson MD\par Spine and Sports Medicine\par \par [employer]\par At [facility]\par \par

## 2020-12-06 NOTE — HISTORY OF PRESENT ILLNESS
[FreeTextEntry1] : 12/3/20\par 73 yo F who presents with for follow up with low back after left SI joint injection on 11/13. Patient reports that since her SIJ injection, she has 80%. Currently pain rated at 1/10. She states that pain has improved with this SIJ injection and following previous 2 x lumbar TFESI. She still feels some tightness when you walk but has decreased as well and is now more of a soreness. Not on any pain medications. She is currently not doing any physical therapy.  \par \par She also reports some pain on the right foot that she has had prior injections on that started after she had acupuncture in the past.  Pain is chronic but has been progressive over the last few weeks.  Patient reports pain localized to the lateral aspect of the foot over the 5th metatarsal.  Patient reports that pain is worse with prolonged standing but she reports no difficulty with weightbearing.  No skin changes.  No recent trauma.  No edema.  No recent imaging.  Patient does endorse a history of osteoporosis.  Patient denies new weakness, numbness or paresthesia.  Denies bowel/bladder dysfunction, fevers, chills, weight loss, night pain, or night sweats.\par \par 10/27/20\par 73 yo F who presents with for follow up with low back and right knee pain.  Patient reports significant improvement in right knee pain following US guided aspiration and CSI.  Patient reports that pain in the low back flared over the past weekend.  Patient denies inciting event, trauma or fall.  Patient reports that pain is now lower in the left buttock and without radicular features.  Pain is worse with prolonged standing.  Patient reports that overall low back pain has improved but she continues to have difficulty with ambulation and a sensation of tightness in the left hip.  Patient denies new weakness, numbness or paresthesia.  Denies bowel/bladder dysfunction, fevers, chills, weight loss, night pain, or night sweats.\par \par 10/21/20\par 73 yo F who presents for follow up with low back and right knee pain.  Patient reports that low back pain with radiation into left lower extremity that she originally presented with has almost completely resolved following 2 x left L2-L3 TFESI.  Patient continues to have residual numbness in the left calf.  Patient now has prominent pain in the right knee.  Pain has been present for several years but worse over the last few months during which she has been limping from low back pain.  Patient reports associated edema.  XR shows degenerative changes.  No previous injections.  Patient reports that she also has left knee pain and low back pain that is lower near her left buttocks but the right knee pain is most prominent today.  Patient denies new weakness, numbness or paresthesia.  Denies bowel/bladder dysfunction, fevers, chills, weight loss, night pain, or night sweats.\par \par 9/3/2020\par 75 y/o F s/p lumbar spinal fusion L3-L5 in 2019, left hip replacement 11/2019 presenting for f/u 2/2 low back pain and radiation into her left leg, now s/p TFESI on 8/21. Patient notes improvement in LBP. Still with knot-like pain in left lower back and limping, as well as pain that radiates down the lateral aspect of her legs. However, pain today is improved to 5-6/10, as compared to 9-10/10 during initial visit. Pain is described as dull and achy, worsened by certain laying in certain positions. During today's visit, states she has noticed intermittent, sharp pain in her buttocks that radiates to her right knee, described as achy, which she has never had before. Was associated with long drive, but currently asymptomatic. Patient denies new weakness, numbness or paresthesia.  Denies bowel/bladder dysfunction, fevers, chills, weight loss, night pain, or night sweats.\par \par \par 8/12/2020\par Pt is a 75 y/o F s/p lumbar spinal fusion L3-L5 in 2019, left hip replacement 11/2019 presenting with 6 month history of low back pain and radiation into her left leg. She was seen by Dr. Pate, who sent her for MRI of lumbar spine. Pt has been having a knot-like pain in her left lower back and when she lies down on a couch or in bed she feels numbness and tingling go down both her legs, opposite the way she leans. The pain is worse on the left side. The pain is described as an achy pain, and rates it as a 4/10 and is constantly present. Pt states that when she lies on her stomach the pain is improved compared to when she is on her side. She does feel some pain in her midback with walking and sitting in one place for a long time. She feels that she is limping while walking. Once in a while she has taken Aleve for pain relief, but does not take it often. Patient denies new weakness, numbness or paresthesia. Denies bowel/bladder dysfunction, fevers, chills, weight loss, night pain, or night sweats.

## 2020-12-06 NOTE — PHYSICAL EXAM
[FreeTextEntry1] : Constitutional: Well-nourished, well-developed, No acute distress\par Respiratory: Good respiratory effort, no SOB\par Psychiatric: Pleasant and normal affect, alert and oriented x3\par Skin: Clean dry and intact B/L UE/LE\par Musculoskeletal: normal except where as noted in regional exam\par \par LUMBAR SPINE:\par Inspection reveals no swelling or erythema\par Range of motion testing shows full flexion without pain, extension to 20° without pain\par no tenderness along lumbar paraspinals or facet joints\par Seated slump test negative \par Straight leg raise negative\par \par HIPS/PELVIS -\par Symmetric in standing and lying\par Hip maneuvers:\par Range of motion shows full without pain \par MARVA negative\par Palpation TTP of left SI joint, right piriformis \par \par NEURO - Normal bulk and tone \par LE strength - 5/5 including hip flexion, knee flexion, knee extension, ankle dorsiflexion, ankle plantarflexion \par Toe-walking and heel-walking intact\par Sensation - intact to light touch in bilateral lower extremities.  \par GAIT - Normal base, normal stride length\par \par Right foot: FAROM, some tenderness to palpation over the lateral foot including the fifth metatarsal, no obvious skin changes or edema, neg talar tilt, neg ant drawer.

## 2020-12-11 NOTE — DISCHARGE NOTE ADULT - MEDICATION SUMMARY - MEDICATIONS TO STOP TAKING
Quality 111:Pneumonia Vaccination Status For Older Adults: Pneumococcal Vaccination not Administered or Previously Received, Reason not Otherwise Specified
Quality 110: Preventive Care And Screening: Influenza Immunization: Influenza immunization was not ordered or administered, reason not given
Detail Level: Detailed
none
I will STOP taking the medications listed below when I get home from the hospital:  None

## 2020-12-18 ENCOUNTER — APPOINTMENT (OUTPATIENT)
Dept: RADIOLOGY | Facility: IMAGING CENTER | Age: 75
End: 2020-12-18
Payer: MEDICARE

## 2020-12-18 ENCOUNTER — OUTPATIENT (OUTPATIENT)
Dept: OUTPATIENT SERVICES | Facility: HOSPITAL | Age: 75
LOS: 1 days | End: 2020-12-18
Payer: MEDICARE

## 2020-12-18 DIAGNOSIS — M79.671 PAIN IN RIGHT FOOT: ICD-10-CM

## 2020-12-18 DIAGNOSIS — Z98.890 OTHER SPECIFIED POSTPROCEDURAL STATES: Chronic | ICD-10-CM

## 2020-12-18 DIAGNOSIS — Z90.13 ACQUIRED ABSENCE OF BILATERAL BREASTS AND NIPPLES: Chronic | ICD-10-CM

## 2020-12-18 PROCEDURE — 73630 X-RAY EXAM OF FOOT: CPT | Mod: 26,RT

## 2020-12-18 PROCEDURE — 73630 X-RAY EXAM OF FOOT: CPT

## 2021-01-01 NOTE — H&P PST ADULT - NS PRO TALK SOMEONE YN
Progress Note - NICU   Baby Boy 2 Huntley Goodpasture) Corbett Stagger 8 days male MRN: [id number]  Unit/Bed#: NICU 25 Encounter: [id number]      Patient Active Problem List   Diagnosis    Prematurity, 2,000-2,499 grams, 33-34 completed weeks    Underfeeding of     Twin liveborn born in hospital by     At risk for hypothermia associated with prematurity    Apnea of prematurity       Subjective/Objective     SUBJECTIVE: Baby Boy 2 (Noa Trinidad) Matt Thorntonggyarelis is now 11 days old, currently adjusted to 35w 5d weeks gestation  Temperatures stable in open crib  Comfortable in room air  1 ABD events in last 24 hours, continues on caffeine Tolerating feeds of MBM fortified to 24 kcal/oz with HHMF  No weight gain overnight  Continues on vitamin D and iron  Labs and orders reviewed  OBJECTIVE:     Vitals:   BP 73/50 (BP Location: Left leg)   Pulse 140   Temp 97 9 °F (36 6 °C) (Axillary)   Resp 45   Ht 18" (45 7 cm)   Wt (!) 2215 g (4 lb 14 1 oz) Comment: x3 attempts  HC 33 cm (12 99")   SpO2 100%   BMI 10 60 kg/m²   69 %ile (Z= 0 51) based on Marita (Boys, 22-50 Weeks) head circumference-for-age based on Head Circumference recorded on 2021  Weight change: 0 g (0 lb)    I/O:  I/O        07 -  0700  07 -  0700  07 -  0700    P  O  18 50 7    Feedings 318 286 35    Total Intake(mL/kg) 336 (151 69) 336 (151 69) 42 (18 96)    Urine (mL/kg/hr)       Emesis/NG output       Stool       Total Output       Net +336 +336 +42           Unmeasured Urine Occurrence 8 x 8 x 1 x    Unmeasured Stool Occurrence 5 x 8 x     Unmeasured Emesis Occurrence 1 x 1 x 1 x          Feeding:        FEEDING TYPE: Feeding Type: Breast milk    BREASTMILK HOLLY/OZ (IF FORTIFIED): Breast Milk holly/oz: 24 Kcal   FORTIFICATION (IF ANY): Fortification of Breast Milk/Formula: HHMF   FEEDING ROUTE: Feeding Route: NG tube, Bottle   WRITTEN FEEDING VOLUME: Breast Milk Dose (ml): 42 mL   LAST FEEDING VOLUME GIVEN PO: Breast Milk - P O  (mL): 7 mL   LAST FEEDING VOLUME GIVEN NG: Breast Milk - Tube (mL): 35 mL       IVF: none    Respiratory settings: Room air          ABD events: None    Current Facility-Administered Medications   Medication Dose Route Frequency Provider Last Rate Last Admin    caffeine citrate (CAFCIT) oral soln 18 4 mg  7 5 mg/kg (Order-Specific) Oral Daily Ata Loja MD   18 4 mg at 05/24/21 1201    cholecalciferol (VITAMIN D) oral liquid 400 Units  400 Units Oral Daily Ata Loja MD   400 Units at 05/25/21 0857    ferrous sulfate (VINNIE-IN-SOL) oral solution 4 95 mg of iron  2 mg/kg of iron (Order-Specific) Oral Q24H Ata Loja MD   4 95 mg of iron at 05/25/21 0857    sucrose 24 % oral solution 1 mL  1 mL Oral Q5 Min PRN FLOR Contreras           Physical Exam:   General Appearance:  Alert, active, no distress, NG in place  Head:  Normocephalic, AFOF                             Eyes:  Conjunctivae clear  Ears:  Normally placed and formed, no anomalies  Nose: nose midline, nares patent   Mouth: palate intact, lips and gums normal             Respiratory:  clear breath sounds, symmetric air entry and chest rise; no retractions, nasal flaring, or grunting   Cardiovascular:  Regular rate and rhythm  No murmur  Adequate perfusion/capillary refill  Abdomen:  Soft, non-tender, non-distended, no masses, bowel sounds present  Genitourinary:  Normal male genitalia  Musculoskeletal:  Moves all extremities equally and spontaneously  Skin/Hair/Nails:   Skin warm, dry, and intact, no rashes or lesions               Neurologic:   Normal tone and reflexes    ----------------------------------------------------------------------------------------------------------------------  IMAGING/LABS/OTHER TESTS    Lab Results: No results found for this or any previous visit (from the past 24 hour(s))  Imaging: No results found      Other Studies: none ----------------------------------------------------------------------------------------------------------------------    Assessment/Plan:  GESTATIONAL AGE:   Di/Di twin male #2 at 34 4/7 weeks gestation delivered via  for maternal cholestasis and concern of growth restriction of Twin #1   He did well in the OR, Apgars 9, 9   Transported to the NICU for prematurity on RA, then required CPAP        Initial  screen negative   NBS normal      Requires intensive monitoring for problems of prematurity  High probability of life threatening clinical deterioration in infant's condition without treatment       PLAN:  - Radiant warmer for thermoregulation  - Speech/PT consult when stable  - Routine pre-discharge screenings including car seat test      RESPIRATORY:  S/p betamethasone -   Initially did well on RA, with occasional grunting and increased WOB and an admission CBG of 7 23/61/41/25/-3 so placed on CPAP 5, 21% ~3hrs of life   CXR on CPAP showed expansion to 8 5 ribs and findings consistent with RLF vs mild RDS   Repeat CBG on CPAP was improved at 7 33/44/37/23/-3  Weaned off CPAP to RA at ~20 hrs of life        -  Increased  [address] started and caffeine bolus given  NC increased to 2L, 21%   Was then placed on CPAP 5, 21% due to persistent events    Weaned off CPAP to RA with improved events        Requires intensive monitoring for increasing  respiratory distress       PLAN:  - Monitor on RA  - Goal saturations > 90%  - Repeat CBG/CXR PRN     APNEA:     multiple events noted   Increased ABD, NC started and caffeine bolus given   Required CPAP for A/B events, maintenance caffeine started      PLAN:  - Continue caffeine at 7 5mg/kg/d  - Monitor A/B event recurrence and severity (last event on )  - Once A/B event free for 3-4 days consider discontinuing caffeine       CARDIAC: Hemodynamically stable  No murmur    Congenital heart disease screen passed      Requires intensive monitoring for PDA and/or deterioration in perfusion       PLAN:  - Continuous cardio/respiratory monitoring  - Monitor clinically     FEN/GI: Initial glucose 46, placed on D10 at 80ckd and started tropihc feeds of EBM or Donor BM to which mom agreed to via OG   Glucoses have been stable in the 70's - 140's    BMP: Na 142, Ca 6 8 and Phos 5  4   Feeding advanced   Mom desires to breastfeed   Ca 7 1, phos 6 4 - improving    weight down 12 7% from birth weight  Discussed eventual transition off donor BM by next week if needed, mom aware and agreeable  Significant reflux noted, feeds decreased to 140 ml/kg/day and ran over 2hrs        Growth Parameters :  Weight: 2215g (18%, Z-score -0 88)   Length: 45 7cm (37%, Z-score -0 32)   HC: 33cm (69%, Z-score +0 51)     Requires intensive monitoring for hypoglycemia and nutritional deficiency  High probability of life threatening clinical deterioration in infant's condition without treatment       PLAN:  - Continue feeds of 24kcal EBM/Donor BM feeds at a TF of 140 ml/kg/day due to AICHA  - Monitor weight (continues below birth weight) while on lower volume feeds -- may need higher fortification   - Run feeds over 90 min  - Mom okay to transition off donor BM if needed, but but is producing enough for both twins   - Encourage maternal lactation and breastfeeding   - Continue Vit D      ID: Sepsis eval not indicated due to scheduled  for maternal cholestasis and growth restriction of Twin A   Baseline CBC reassuring, WBC 13 1 ([id number])         PLAN:  - Monitor clinically     HEME: Initial H/H 16 7/44 7, Plt 256       Requires intensive monitoring for anemia       PLAN:  - Monitor clinically  - Trend Hct on CBG, CBC periodically  - Continue Fe supplement started on       JAUNDICE: Mom A+, Ab negative       TBili 4 61 at 25hrs of life    TBili 11 17      TBili 13 1, started phototherapy    Tbili  9 76 - phototherapy discontinued  5/23  Tbili down even more to 7 82 spontaneously     Requires intensive monitoring for hyperbilirubinemia       PLAN:  - Monitor clinically     ROP: Does not qualify for ROP as >1500g at birth      NEURO: Neuro exam WNL        PLAN:  - Monitor clinically  - Speech, OT/PT when medically appropriate  - Early Intervention referral upon discharge     SOCIAL: Father in delivery room to see infant just after birth   Parents have 3 other children together        COMMUNICATION: Mom not present during rounds, will call and update her afterwards if she does not visit today      no

## 2021-01-07 ENCOUNTER — APPOINTMENT (OUTPATIENT)
Dept: PHYSICAL MEDICINE AND REHAB | Facility: CLINIC | Age: 76
End: 2021-01-07
Payer: MEDICARE

## 2021-01-07 VITALS — DIASTOLIC BLOOD PRESSURE: 83 MMHG | HEART RATE: 71 BPM | OXYGEN SATURATION: 94 % | SYSTOLIC BLOOD PRESSURE: 131 MMHG

## 2021-01-07 DIAGNOSIS — M79.671 PAIN IN RIGHT FOOT: ICD-10-CM

## 2021-01-07 PROCEDURE — 99214 OFFICE O/P EST MOD 30 MIN: CPT

## 2021-01-10 PROBLEM — M79.671 RIGHT FOOT PAIN: Status: ACTIVE | Noted: 2020-12-03

## 2021-01-10 NOTE — ASSESSMENT
[FreeTextEntry1] : 75 y/o F s/p lumbar spinal fusion L3-L5 in 2019, left hip replacement 11/2019 with 6 month history of low back pain and radiation into her legs on initial evaluation, s/p 2 x TFESI now s/p SIJ injection on the left with almost complete resolution of her low back pain.  Patient also with acute on chronic right foot pain.  \par \par -MRI lumbar spine reviewed\par -Right foot xrays reviewed with patient.\par -Referral for right foot MRI to rule out stress fracture, soft tissue pathology or visible nerve damage to right foot \par -Hold PT for now pending MRI results\par -RTC following imaging to review imaging results\par \par Vasile Henson MD\par Spine and Sports Medicine\par \par [employer]\par At [facility]\par \par

## 2021-01-10 NOTE — PHYSICAL EXAM
[FreeTextEntry1] : Constitutional: Well-nourished, well-developed, No acute distress\par Respiratory: Good respiratory effort, no SOB\par Psychiatric: Pleasant and normal affect, alert and oriented x3\par Skin: Clean dry and intact B/L UE/LE\par Musculoskeletal: normal except where as noted in regional exam\par \par LUMBAR SPINE:\par Inspection reveals no swelling or erythema\par Range of motion testing shows full flexion without pain, extension to 20° without pain\par no tenderness along lumbar paraspinals or facet joints\par Seated slump test negative \par Straight leg raise negative bilaterally\par \par HIPS/PELVIS -\par Symmetric in standing and lying\par Hip maneuvers:\par Range of motion shows full without pain \par MARVA negative\par left SI joint TTP \par \par NEURO - Normal bulk and tone \par LE strength - 5/5 including hip flexion, knee flexion, knee extension, ankle dorsiflexion, ankle plantarflexion \par Toe-walking and heel-walking intact\par Sensation - intact to light touch in bilateral lower extremities.  \par GAIT - Normal base, normal stride length\par \par Right knee - non tender over joint line. Full ROM. +Baker's cyst\par Right foot: FAROM, TTP over the lateral foot including the fifth metatarsal, no obvious skin changes or edema, neg talar tilt, neg ant drawer.

## 2021-01-10 NOTE — HISTORY OF PRESENT ILLNESS
[FreeTextEntry1] : 1/7/21\par 74yo F who presents with for follow up or right foot pain for which we obtained Xrays after 12/3/20 appointment. Patient reports pain is 4/10, aching/stinging in quality, chronic but worse over past two months.  Patient reports pain localized to the lateral aspect of the foot over the 5th metatarsal.  Patient reports that pain is worse with prolonged standing but she reports no difficulty with weightbearing. Pain is unchanged from prior visit. She has not been using Voltaren gel as prescribed for right foot pain. Patient denies new weakness, numbness or paresthesia.  Denies bowel/bladder dysfunction, fevers, chills, weight loss, night pain, or night sweats.\par \par Also patient reports chronic left sided low back pain, 3/10. Right knee pain is reduced to 1/10 but she reports flare in pain and swelling after PT evaluation last week. Pain has since resolved in the low back and right knee.\par \par 12/3/20\par 73 yo F who presents with for follow up with low back after left SI joint injection on 11/13. Patient reports that since her SIJ injection, she has 80%. Currently pain rated at 1/10. She states that pain has improved with this SIJ injection and following previous 2 x lumbar TFESI. She still feels some tightness when you walk but has decreased as well and is now more of a soreness. Not on any pain medications. She is currently not doing any physical therapy.  \par \par She also reports some pain on the right foot that she has had prior injections on that started after she had acupuncture in the past.  Pain is chronic but has been progressive over the last few weeks.  Patient reports pain localized to the lateral aspect of the foot over the 5th metatarsal.  Patient reports that pain is worse with prolonged standing but she reports no difficulty with weightbearing.  No skin changes.  No recent trauma.  No edema.  No recent imaging.  Patient does endorse a history of osteoporosis.  Patient denies new weakness, numbness or paresthesia.  Denies bowel/bladder dysfunction, fevers, chills, weight loss, night pain, or night sweats.\par \par 10/27/20\par 73 yo F who presents with for follow up with low back and right knee pain.  Patient reports significant improvement in right knee pain following US guided aspiration and CSI.  Patient reports that pain in the low back flared over the past weekend.  Patient denies inciting event, trauma or fall.  Patient reports that pain is now lower in the left buttock and without radicular features.  Pain is worse with prolonged standing.  Patient reports that overall low back pain has improved but she continues to have difficulty with ambulation and a sensation of tightness in the left hip.  Patient denies new weakness, numbness or paresthesia.  Denies bowel/bladder dysfunction, fevers, chills, weight loss, night pain, or night sweats.\par \par 10/21/20\par 73 yo F who presents for follow up with low back and right knee pain.  Patient reports that low back pain with radiation into left lower extremity that she originally presented with has almost completely resolved following 2 x left L2-L3 TFESI.  Patient continues to have residual numbness in the left calf.  Patient now has prominent pain in the right knee.  Pain has been present for several years but worse over the last few months during which she has been limping from low back pain.  Patient reports associated edema.  XR shows degenerative changes.  No previous injections.  Patient reports that she also has left knee pain and low back pain that is lower near her left buttocks but the right knee pain is most prominent today.  Patient denies new weakness, numbness or paresthesia.  Denies bowel/bladder dysfunction, fevers, chills, weight loss, night pain, or night sweats.\par \par 9/3/2020\par 73 y/o F s/p lumbar spinal fusion L3-L5 in 2019, left hip replacement 11/2019 presenting for f/u 2/2 low back pain and radiation into her left leg, now s/p TFESI on 8/21. Patient notes improvement in LBP. Still with knot-like pain in left lower back and limping, as well as pain that radiates down the lateral aspect of her legs. However, pain today is improved to 5-6/10, as compared to 9-10/10 during initial visit. Pain is described as dull and achy, worsened by certain laying in certain positions. During today's visit, states she has noticed intermittent, sharp pain in her buttocks that radiates to her right knee, described as achy, which she has never had before. Was associated with long drive, but currently asymptomatic. Patient denies new weakness, numbness or paresthesia.  Denies bowel/bladder dysfunction, fevers, chills, weight loss, night pain, or night sweats.\par \par \par 8/12/2020\par Pt is a 73 y/o F s/p lumbar spinal fusion L3-L5 in 2019, left hip replacement 11/2019 presenting with 6 month history of low back pain and radiation into her left leg. She was seen by Dr. Pate, who sent her for MRI of lumbar spine. Pt has been having a knot-like pain in her left lower back and when she lies down on a couch or in bed she feels numbness and tingling go down both her legs, opposite the way she leans. The pain is worse on the left side. The pain is described as an achy pain, and rates it as a 4/10 and is constantly present. Pt states that when she lies on her stomach the pain is improved compared to when she is on her side. She does feel some pain in her midback with walking and sitting in one place for a long time. She feels that she is limping while walking. Once in a while she has taken Aleve for pain relief, but does not take it often. Patient denies new weakness, numbness or paresthesia. Denies bowel/bladder dysfunction, fevers, chills, weight loss, night pain, or night sweats.

## 2021-01-20 ENCOUNTER — APPOINTMENT (OUTPATIENT)
Dept: MRI IMAGING | Facility: CLINIC | Age: 76
End: 2021-01-20

## 2021-05-20 ENCOUNTER — APPOINTMENT (OUTPATIENT)
Dept: PHYSICAL MEDICINE AND REHAB | Facility: CLINIC | Age: 76
End: 2021-05-20

## 2021-06-10 ENCOUNTER — APPOINTMENT (OUTPATIENT)
Dept: PHYSICAL MEDICINE AND REHAB | Facility: CLINIC | Age: 76
End: 2021-06-10
Payer: MEDICARE

## 2021-06-10 VITALS
WEIGHT: 154 LBS | BODY MASS INDEX: 25.66 KG/M2 | DIASTOLIC BLOOD PRESSURE: 69 MMHG | HEIGHT: 65 IN | OXYGEN SATURATION: 96 % | TEMPERATURE: 209.66 F | SYSTOLIC BLOOD PRESSURE: 96 MMHG | HEART RATE: 69 BPM

## 2021-06-10 DIAGNOSIS — M54.16 RADICULOPATHY, LUMBAR REGION: ICD-10-CM

## 2021-06-10 DIAGNOSIS — M48.061 SPINAL STENOSIS, LUMBAR REGION WITHOUT NEUROGENIC CLAUDICATION: ICD-10-CM

## 2021-06-10 DIAGNOSIS — M51.26 OTHER INTERVERTEBRAL DISC DISPLACEMENT, LUMBAR REGION: ICD-10-CM

## 2021-06-10 DIAGNOSIS — M25.561 PAIN IN RIGHT KNEE: ICD-10-CM

## 2021-06-10 PROCEDURE — 99214 OFFICE O/P EST MOD 30 MIN: CPT

## 2021-06-12 PROBLEM — M25.561 RIGHT KNEE PAIN: Status: ACTIVE | Noted: 2020-09-25

## 2021-06-12 PROBLEM — M51.26 LUMBAR HERNIATED DISC: Status: ACTIVE | Noted: 2017-05-18

## 2021-06-12 PROBLEM — M48.061 LUMBAR CANAL STENOSIS: Status: ACTIVE | Noted: 2017-05-18

## 2021-06-12 PROBLEM — M54.16 LUMBAR RADICULOPATHY: Status: ACTIVE | Noted: 2020-06-19

## 2021-06-12 NOTE — ASSESSMENT
[FreeTextEntry1] : 75 y/o F s/p lumbar spinal fusion L3-L5 in 2019, left hip replacement 11/2019 low back pain consistent with lumbar radiculopathy secondary to lumbar spinal stenosis and degenerative disc disease.\par \par Patient reports that she has had significant improvement of her pain following left L2-L3 TFESI x 2 and left SI joint injection.  However, she reports that pain relief had been partial and temporary lasting about 2-3 months.  Further, patient describes the pain as radiating past the knee to the left calf in what seems like an L5 distribution.  On physical exam, there is tenderness at lower lumbar paraspinals and sciatic notch with a positive left sided SLR which provides further clinical evidence of a lower lumbar radiculopathy.  However, MRI lumbar spine is unremarkable from L3-L5.  It is possible that a lower lumbar radiculopathy is secondary to central stenosis at L2-L3.  Given the discordance of her pain with MRI findings and incomplete clinical response to previous injections, I recommend that Ms. Mayo undergo testing with EMG/NCS.\par \par -MRI lumbar spine reviewed\par -EMG/NCS test ordered.  Will work on obtaining insurance approval and then schedule this study with patient.\par -RTC for EMG. \par \par Vasile Henson MD\par Spine and Sports Medicine\par \par [employer]\par At [facility]\par \par

## 2021-06-12 NOTE — HISTORY OF PRESENT ILLNESS
[FreeTextEntry1] : 6/10/21\par 74 yo F who presents for follow up with low back and right knee pain.  Patient reports that she continues to experience significant improvement in her pain following right knee corticosteroid injection.  However, patient continues to complain of left sided low back pain with radiation into the right lower extremity.  Patient reports while she has been experiencing some improvement in her pain following previous left L2-L3 TFESI and left SI joint injection, this pain relief has been partial and temporary.  Patient currently describes her pain as radiating into the right lateral aspect of her calf.  MRI lumbar spine reviewed showing degenerative changes at L2-L3 leading to significant central canal stenosis.  Patient denies new weakness, numbness or paresthesia.  Denies bowel/bladder dysfunction, fevers, chills, weight loss, night pain, or night sweats.\par \par 1/7/21\par 76yo F who presents with for follow up or right foot pain for which we obtained Xrays after 12/3/20 appointment. Patient reports pain is 4/10, aching/stinging in quality, chronic but worse over past two months.  Patient reports pain localized to the lateral aspect of the foot over the 5th metatarsal.  Patient reports that pain is worse with prolonged standing but she reports no difficulty with weightbearing. Pain is unchanged from prior visit. She has not been using Voltaren gel as prescribed for right foot pain. Patient denies new weakness, numbness or paresthesia.  Denies bowel/bladder dysfunction, fevers, chills, weight loss, night pain, or night sweats.\par \par Also patient reports chronic left sided low back pain, 3/10. Right knee pain is reduced to 1/10 but she reports flare in pain and swelling after PT evaluation last week. Pain has since resolved in the low back and right knee.\par \par 12/3/20\par 75 yo F who presents with for follow up with low back after left SI joint injection on 11/13. Patient reports that since her SIJ injection, she has 80%. Currently pain rated at 1/10. She states that pain has improved with this SIJ injection and following previous 2 x lumbar TFESI. She still feels some tightness when you walk but has decreased as well and is now more of a soreness. Not on any pain medications. She is currently not doing any physical therapy.  \par \par She also reports some pain on the right foot that she has had prior injections on that started after she had acupuncture in the past.  Pain is chronic but has been progressive over the last few weeks.  Patient reports pain localized to the lateral aspect of the foot over the 5th metatarsal.  Patient reports that pain is worse with prolonged standing but she reports no difficulty with weightbearing.  No skin changes.  No recent trauma.  No edema.  No recent imaging.  Patient does endorse a history of osteoporosis.  Patient denies new weakness, numbness or paresthesia.  Denies bowel/bladder dysfunction, fevers, chills, weight loss, night pain, or night sweats.\par \par 10/27/20\par 75 yo F who presents with for follow up with low back and right knee pain.  Patient reports significant improvement in right knee pain following US guided aspiration and CSI.  Patient reports that pain in the low back flared over the past weekend.  Patient denies inciting event, trauma or fall.  Patient reports that pain is now lower in the left buttock and without radicular features.  Pain is worse with prolonged standing.  Patient reports that overall low back pain has improved but she continues to have difficulty with ambulation and a sensation of tightness in the left hip.  Patient denies new weakness, numbness or paresthesia.  Denies bowel/bladder dysfunction, fevers, chills, weight loss, night pain, or night sweats.\par \par 10/21/20\par 75 yo F who presents for follow up with low back and right knee pain.  Patient reports that low back pain with radiation into left lower extremity that she originally presented with has almost completely resolved following 2 x left L2-L3 TFESI.  Patient continues to have residual numbness in the left calf.  Patient now has prominent pain in the right knee.  Pain has been present for several years but worse over the last few months during which she has been limping from low back pain.  Patient reports associated edema.  XR shows degenerative changes.  No previous injections.  Patient reports that she also has left knee pain and low back pain that is lower near her left buttocks but the right knee pain is most prominent today.  Patient denies new weakness, numbness or paresthesia.  Denies bowel/bladder dysfunction, fevers, chills, weight loss, night pain, or night sweats.\par \par 9/3/2020\par 73 y/o F s/p lumbar spinal fusion L3-L5 in 2019, left hip replacement 11/2019 presenting for f/u 2/2 low back pain and radiation into her left leg, now s/p TFESI on 8/21. Patient notes improvement in LBP. Still with knot-like pain in left lower back and limping, as well as pain that radiates down the lateral aspect of her legs. However, pain today is improved to 5-6/10, as compared to 9-10/10 during initial visit. Pain is described as dull and achy, worsened by certain laying in certain positions. During today's visit, states she has noticed intermittent, sharp pain in her buttocks that radiates to her right knee, described as achy, which she has never had before. Was associated with long drive, but currently asymptomatic. Patient denies new weakness, numbness or paresthesia.  Denies bowel/bladder dysfunction, fevers, chills, weight loss, night pain, or night sweats.\par \par \par 8/12/2020\par Pt is a 73 y/o F s/p lumbar spinal fusion L3-L5 in 2019, left hip replacement 11/2019 presenting with 6 month history of low back pain and radiation into her left leg. She was seen by Dr. Pate, who sent her for MRI of lumbar spine. Pt has been having a knot-like pain in her left lower back and when she lies down on a couch or in bed she feels numbness and tingling go down both her legs, opposite the way she leans. The pain is worse on the left side. The pain is described as an achy pain, and rates it as a 4/10 and is constantly present. Pt states that when she lies on her stomach the pain is improved compared to when she is on her side. She does feel some pain in her midback with walking and sitting in one place for a long time. She feels that she is limping while walking. Once in a while she has taken Aleve for pain relief, but does not take it often. Patient denies new weakness, numbness or paresthesia. Denies bowel/bladder dysfunction, fevers, chills, weight loss, night pain, or night sweats.

## 2021-06-12 NOTE — PHYSICAL EXAM
[FreeTextEntry1] : Constitutional: Well-nourished, well-developed, No acute distress\par Respiratory: Good respiratory effort, no SOB\par Psychiatric: Pleasant and normal affect, alert and oriented x3\par Skin: Clean dry and intact B/L UE/LE\par Musculoskeletal: normal except where as noted in regional exam\par \par LUMBAR SPINE:\par Inspection reveals no swelling or erythema\par Range of motion testing shows full flexion without pain, extension to 20° without pain\par tenderness left lumbar paraspinals, tenderness over left sciatic notch\par Seated slump test negative \par Straight leg raise positive left\par \par HIPS/PELVIS -\par Symmetric in standing and lying\par Hip maneuvers:\par Range of motion shows full without pain \par MARVA negative\par left SI joint TTP \par \par NEURO - Normal bulk and tone \par LE strength - 5/5 including hip flexion, knee flexion, knee extension, ankle dorsiflexion, ankle plantarflexion \par Toe-walking and heel-walking intact\par Sensation - intact to light touch in bilateral lower extremities.  \par GAIT - Normal base, normal stride length\par \par Right knee - non tender over joint line. Full ROM. +Baker's cyst\par Right foot: FAROM, TTP over the lateral foot including the fifth metatarsal, no obvious skin changes or edema, neg talar tilt, neg ant drawer.

## 2021-07-07 ENCOUNTER — APPOINTMENT (OUTPATIENT)
Dept: PHYSICAL MEDICINE AND REHAB | Facility: CLINIC | Age: 76
End: 2021-07-07

## 2022-01-27 ENCOUNTER — TRANSCRIPTION ENCOUNTER (OUTPATIENT)
Age: 77
End: 2022-01-27

## 2022-05-26 ENCOUNTER — APPOINTMENT (OUTPATIENT)
Dept: MRI IMAGING | Facility: IMAGING CENTER | Age: 77
End: 2022-05-26

## 2022-07-20 ENCOUNTER — OFFICE (OUTPATIENT)
Dept: URBAN - METROPOLITAN AREA CLINIC 27 | Facility: CLINIC | Age: 77
Setting detail: OPHTHALMOLOGY
End: 2022-07-20
Payer: MEDICARE

## 2022-07-20 DIAGNOSIS — H15.101: ICD-10-CM

## 2022-07-20 PROCEDURE — 99213 OFFICE O/P EST LOW 20 MIN: CPT | Performed by: OPHTHALMOLOGY

## 2022-07-20 ASSESSMENT — REFRACTION_CURRENTRX
OS_CYLINDER: SPH
OD_SPHERE: +2.50
OD_OVR_VA: 20/
OD_AXIS: 138
OS_ADD: +2.50
OD_CYLINDER: +0.50
OS_SPHERE: +2.75
OS_VPRISM_DIRECTION: PROGS
OD_VPRISM_DIRECTION: PROGS
OS_OVR_VA: 20/
OD_ADD: +2.50

## 2022-07-20 ASSESSMENT — CONFRONTATIONAL VISUAL FIELD TEST (CVF)
OD_FINDINGS: FULL
OS_FINDINGS: FULL

## 2022-07-20 ASSESSMENT — AXIALLENGTH_DERIVED
OD_AL: 23.0202
OS_AL: 22.7609

## 2022-07-20 ASSESSMENT — KERATOMETRY
METHOD_AUTO_MANUAL: AUTO
OD_K2POWER_DIOPTERS: 41.25
OS_K1POWER_DIOPTERS: 41.50
OS_K2POWER_DIOPTERS: 42.50
OD_K1POWER_DIOPTERS: 41.25
OD_AXISANGLE_DEGREES: 090
OS_AXISANGLE_DEGREES: 088

## 2022-07-20 ASSESSMENT — REFRACTION_AUTOREFRACTION
OS_AXIS: 041
OD_CYLINDER: +0.75
OD_SPHERE: +3.25
OS_CYLINDER: +0.75
OD_AXIS: 171
OS_SPHERE: +3.25

## 2022-07-20 ASSESSMENT — SPHEQUIV_DERIVED
OS_SPHEQUIV: 3.625
OD_SPHEQUIV: 3.625

## 2022-07-20 ASSESSMENT — VISUAL ACUITY
OD_BCVA: 20/25+1
OS_BCVA: 20/25-2

## 2022-07-20 ASSESSMENT — TONOMETRY
OS_IOP_MMHG: 16
OD_IOP_MMHG: 16

## 2022-08-01 ENCOUNTER — OFFICE (OUTPATIENT)
Dept: URBAN - METROPOLITAN AREA CLINIC 27 | Facility: CLINIC | Age: 77
Setting detail: OPHTHALMOLOGY
End: 2022-08-01
Payer: MEDICARE

## 2022-08-01 ENCOUNTER — RX ONLY (RX ONLY)
Age: 77
End: 2022-08-01
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
## 2022-08-01 DIAGNOSIS — H18.413: ICD-10-CM

## 2022-08-01 DIAGNOSIS — H15.101: ICD-10-CM

## 2022-08-01 DIAGNOSIS — H25.13: ICD-10-CM

## 2022-08-01 PROCEDURE — 92014 COMPRE OPH EXAM EST PT 1/>: CPT | Performed by: OPHTHALMOLOGY

## 2022-08-01 PROCEDURE — 92015 DETERMINE REFRACTIVE STATE: CPT | Performed by: OPHTHALMOLOGY

## 2022-08-01 ASSESSMENT — REFRACTION_CURRENTRX
OD_SPHERE: +2.50
OS_CYLINDER: SPH
OS_VPRISM_DIRECTION: PROGS
OD_AXIS: 138
OD_VPRISM_DIRECTION: PROGS
OD_CYLINDER: +0.50
OD_ADD: +2.50
OS_OVR_VA: 20/
OD_OVR_VA: 20/
OS_ADD: +2.50
OS_SPHERE: +2.75

## 2022-08-01 ASSESSMENT — REFRACTION_AUTOREFRACTION
OD_CYLINDER: +1.25
OD_SPHERE: +3.00
OS_SPHERE: +3.00
OS_CYLINDER: +0.75
OD_AXIS: 177
OS_AXIS: 10

## 2022-08-01 ASSESSMENT — KERATOMETRY
OS_AXISANGLE_DEGREES: 104
OS_K1POWER_DIOPTERS: 42.00
OD_AXISANGLE_DEGREES: 041
OD_K2POWER_DIOPTERS: 41.25
OD_K1POWER_DIOPTERS: 40.75
OS_K2POWER_DIOPTERS: 42.50
METHOD_AUTO_MANUAL: AUTO

## 2022-08-01 ASSESSMENT — VISUAL ACUITY
OS_BCVA: 20/25
OD_BCVA: 20/30

## 2022-08-01 ASSESSMENT — REFRACTION_MANIFEST
OD_SPHERE: +2.75
OS_ADD: +2.50
OD_VA1: 20/25
OD_AXIS: 175
OD_CYLINDER: +1.00
OS_SPHERE: +2.75
OS_CYLINDER: +0.75
OS_VA1: 20/40
OS_AXIS: 180
OD_ADD: +2.50

## 2022-08-01 ASSESSMENT — SPHEQUIV_DERIVED
OS_SPHEQUIV: 3.375
OD_SPHEQUIV: 3.625
OD_SPHEQUIV: 3.25
OS_SPHEQUIV: 3.125

## 2022-08-01 ASSESSMENT — AXIALLENGTH_DERIVED
OD_AL: 23.108
OS_AL: 22.7661
OS_AL: 22.8571
OD_AL: 23.249

## 2022-08-01 ASSESSMENT — TONOMETRY
OS_IOP_MMHG: 13
OD_IOP_MMHG: 13

## 2022-08-01 ASSESSMENT — CONFRONTATIONAL VISUAL FIELD TEST (CVF)
OS_FINDINGS: FULL
OD_FINDINGS: FULL

## 2022-08-17 ENCOUNTER — OFFICE (OUTPATIENT)
Dept: URBAN - METROPOLITAN AREA CLINIC 27 | Facility: CLINIC | Age: 77
Setting detail: OPHTHALMOLOGY
End: 2022-08-17
Payer: MEDICARE

## 2022-08-17 DIAGNOSIS — H18.413: ICD-10-CM

## 2022-08-17 DIAGNOSIS — H15.101: ICD-10-CM

## 2022-08-17 DIAGNOSIS — H25.13: ICD-10-CM

## 2022-08-17 PROBLEM — H52.7 REFRACTIVE ERROR: Status: ACTIVE | Noted: 2022-08-17

## 2022-08-17 PROCEDURE — 99213 OFFICE O/P EST LOW 20 MIN: CPT | Performed by: OPHTHALMOLOGY

## 2022-08-17 ASSESSMENT — REFRACTION_MANIFEST
OD_VA1: 20/25-2
OS_VA1: 20/40
OS_CYLINDER: +0.75
OD_AXIS: 170
OS_AXIS: 180
OS_AXIS: 005
OD_VA1: 20/25
OS_SPHERE: +2.75
OS_ADD: +2.50
OD_CYLINDER: +1.00
OD_SPHERE: +2.75
OS_CYLINDER: +0.75
OS_SPHERE: +2.25
OD_AXIS: 175
OD_CYLINDER: +1.00
OD_SPHERE: +2.75
OS_VA1: 20/40+2
OD_ADD: +2.50

## 2022-08-17 ASSESSMENT — REFRACTION_AUTOREFRACTION
OD_AXIS: 167
OS_CYLINDER: +0.75
OS_SPHERE: +3.00
OS_AXIS: 031
OD_CYLINDER: +1.00
OD_SPHERE: +2.75

## 2022-08-17 ASSESSMENT — SPHEQUIV_DERIVED
OD_SPHEQUIV: 3.25
OS_SPHEQUIV: 3.125
OD_SPHEQUIV: 3.25
OS_SPHEQUIV: 3.375
OS_SPHEQUIV: 2.625
OD_SPHEQUIV: 3.25

## 2022-08-17 ASSESSMENT — REFRACTION_CURRENTRX
OD_AXIS: 158
OD_AXIS: 138
OS_ADD: +2.50
OD_OVR_VA: 20/
OS_SPHERE: +2.75
OD_CYLINDER: +0.50
OS_OVR_VA: 20/
OD_VPRISM_DIRECTION: PROGS
OS_SPHERE: +2.75
OD_OVR_VA: 20/
OS_ADD: +2.00
OS_VPRISM_DIRECTION: PROGS
OD_SPHERE: +2.50
OD_ADD: +2.50
OD_VPRISM_DIRECTION: PROGS
OS_CYLINDER: +0.75
OD_AXIS: 175
OD_SPHERE: +2.75
OD_SPHERE: +2.50
OD_ADD: +2.50
OS_CYLINDER: SPH
OD_VPRISM_DIRECTION: PROGS
OS_OVR_VA: 20/
OS_VPRISM_DIRECTION: PROGS
OD_ADD: +2.00
OD_CYLINDER: +1.00
OD_CYLINDER: +0.50
OS_VPRISM_DIRECTION: PROGS
OD_OVR_VA: 20/
OS_OVR_VA: 20/
OS_ADD: +2.50
OS_SPHERE: +2.50
OS_AXIS: 179
OS_CYLINDER: SPH

## 2022-08-17 ASSESSMENT — TONOMETRY
OS_IOP_MMHG: 15
OD_IOP_MMHG: 18

## 2022-08-17 ASSESSMENT — AXIALLENGTH_DERIVED
OD_AL: 23.116
OS_AL: 22.7661
OD_AL: 23.116
OS_AL: 23.0415
OS_AL: 22.8571
OD_AL: 23.116

## 2022-08-17 ASSESSMENT — KERATOMETRY
OD_K1POWER_DIOPTERS: 41.25
OD_AXISANGLE_DEGREES: 095
OS_AXISANGLE_DEGREES: 081
OS_K2POWER_DIOPTERS: 42.75
OD_K2POWER_DIOPTERS: 41.50
OS_K1POWER_DIOPTERS: 41.75
METHOD_AUTO_MANUAL: AUTO

## 2022-08-17 ASSESSMENT — VISUAL ACUITY
OD_BCVA: 20/40
OS_BCVA: 20/40+1

## 2023-03-31 NOTE — PHYSICAL THERAPY INITIAL EVALUATION ADULT - BRACE/ORTHOTICS
[facility] Home Care Instructions:    Changing your bandage...  Keep your dressing clean and dry.  It will be changed at your first postop visit.    Showering...  After the surgical dressing has been removed and there has been no drainage from the wound for 24 hours, you may shower.  Gently wipe the area with soap and water, rinse thoroughly and pat the area dry.    Motion and exercise...  You were sent home with a knee immobilizer on your surgical leg; this should remain in place until your follow-up appointment.  It may be taken off for CPM use.    You may not put weight on the surgical leg until directed to do so. Crutches or a walker should be used for safe ambulation.     It is important to keep the surgical leg elevated as much as possible in the first days after surgery to reduce swelling.  You should also work on straightening the leg while it is supported on a bed, couch or foot rest.  If a CPM (continuous passive motion machine) has been ordered for you, begin using it within 24 hours following surgery.      Pain medication...   Start taking your pain medication as soon as you begin experiencing pain.  On the day of surgery, take a dose of the pain medication before going to bed even if you haven't had any pain yet.  It is important to control the pain early on after surgery.  Take the medication as prescribed and keep a log of the amount and times medication is taken.  Remember to drink extra fluids, eat fruits and fiber, etc. to avoid constipation.  Applying an ice pack to the surgical site several times a day will also help control the pain.    Please call our office at [phone] if you have further questions.  Dr. Garcia's cell phone number is [phone] and may be used after normal business hours.  It is always best to text if possible.     If you have any concerns following your Procedure/Surgery and cannot reach your physician's office, please contact [facility]  at [phone] for assistance.    Care After Anesthesia or Sedation    After discharge   Due to the medicine given, someone must drive you home. It is strongly recommended to have someone stay with you at home the day of discharge and the night after surgery.   If you have infants or small children at home, please have someone help you for at least 24 hours after your surgery.   Do not drive for at least 24 hours after surgery (or as told by your doctor).   Rest for the remainder of the day. Go up and down stairs slowly.   Do not smoke after surgery. Smoking can delay healing.   Do not operate heavy or potential harmful equipment.   Do not make legally binding decisions.   Do not drink alcohol for 24 hours.    Diet   Drink plenty of fluids (6 to 8 glasses of water a day).  Resume your regular diet as able.  Avoid greasy or spicy foods for 24 hours.   Start eating a bland diet (toast, gelatin, 7-up, hot cereal, crackers, sherbet, broth soup).    Nausea   Nausea may be expected for the first 24 to 48 hours.  Drink small amounts of fluids such as water or sports drink  Try sucking on hard candy      Urination   The effects of anesthetics may cause some people to have trouble passing urine the day of surgery. Drink a lot of fluids to help prevent this.   Try to urinate within 8 hours of surgery.   If you are unable to pass urine and feel like you need to, call your doctor or the hospital.    Pain control   Some incisions are injected with a long acting local anesthetic; it will wear off in 4 to 6 hours. You can expect to have some pain at this time.   Treat your pain with the prescribed pain medicine before it wears off. Do not wait until your pain becomes severe.   Ask your nurse when you had your last pain medicine, so you know when you can take another one after you get home.    Call your doctor if you have:   Nausea and vomiting that does not stop   Fever over 101° F   Pain not relieved by pain medication   If you  are unable to pass your urine or you have not passed urine in the last 8 hours.   Unusual changes in behavior   Dizziness   Hives  If you are not able to reach your doctor, you may call the emergency department.    Wound Healing: What You Should Know    Do I have an infection?  Your body may show signs your wound is infected. If you see one or more of these signs, please call your health care provider:   Redness and swelling - Increased redness and swelling around the wound (some redness is expected in the first 48 hours).   Warmth - The area around the wound is warmer than the surrounding skin.   Fever - Your temperature is above 101º F or stays above 100º F.   Odor - A new or stronger, sweet or foul smell coming from the wound.   Swelling - Swelling spreading to other areas.   Drainage - Large amounts of drainage that involves blood, clear fluid or pus from the wound. Or, the drainage amount increases or changes color. (It is normal to have a small amount of drainage.)   Pain - Increase in your pain or change in the location of the pain.   Separation - Any place where the incision is  or opening.    How can I help prevent the spread of infection when I take care of my wound?  1. Use good handwashing techniques before and after contact with your wound. Here are the steps to follow:  Handwashing with soap and water:   Wet hands with warm water and apply soap.   Rub well over all surfaces for at least 15 seconds.   Rinse well under water.   Dry hands with clean towels.   Turn off faucet with clean towels to prevent reinfecting hands.  Handwashing with a waterless alcohol-based product or gel:  Apply approximately a nickel sized amount of product to palm of hand.  Rub hands together, covering all surfaces including nails, until product evaporates, about 10 to 15 seconds.  Use clean latex or vinyl gloves if cleaning or touching wound surface.  Keep nails short and remove nail polish. Long nails or polish can  harbor bacteria.  Keep jewelry clean or remove during wound care.  Use hypoallergenic lotions with anti-bacterial agents on skin surrounding wound to maintain moisture and prevent irritation.    What else can I do to help my wound heal quickly and prevent other wounds?   Stay active - Activity and exercise promote good circulation, which leads to wound healing.   Avoid smoking - Smoking narrows your blood vessels, which decreases both the blood supply to your wound and the amount of oxygen in your blood. This will decrease your ability to heal and increase your chance of infection.   Avoid alcohol - Alcohol decreases the ability of your body to fight infection.   Avoid sitting with legs or ankles crossed - This can compress the blood vessels in your legs and decrease the blood supply to your wound.   Eat a balanced diet - If you are unable to eat a balanced diet or required foods, you may benefit from a vitamin or mineral supplement.    Pain Management  Special Note: Be sure to follow any specific post-op instructions from your surgeon or nurse.     Once you’re home, you may have some pain, since even minor surgery causes swelling and breakdown of tissue. When it comes to effective pain management, the tips you learned in the hospital also work at home. To get the best pain relief possible, remember these points:    Use your medication only as directed  If your pain is not relieved or if it gets worse, call your doctor.  If pain lessens, try taking your medication less often.  Remember that medications need time to work  Most pain relievers taken by mouth need at least 20-30 minutes to take effect.  Take pain medication at regular times as directed. Don’t wait until the pain gets bad to take it.  Time your medication  Try to time your medication so that you take it before beginning an activity, such as dressing or sitting at the table for dinner.  Taking your medication at night may help you get a good night’s  rest.  Eat lots of fruit and vegetables  Constipation is a common side effect with some pain medications. Eating fruit and vegetables can help.  Drink lots of fluids.  Avoid drinking alcohol while taking pain medication  Mixing alcohol and pain medication can cause dizziness and slow your respiratory system. It can even be fatal.  Avoid driving or operating machinery while taking pain medication.  In addition, other ways to decrease pain     Relaxing techniques-slow, deep breathing, imagery, watching TV, listening to music      Heat or cold      Massage      Rest and changing your position in bed       Want to Say “Thank You” to a Nurse?  The [employer] Award® was created in memory of MARILY Lopez by his family to say thank you to bedside nurses who provide an outstanding level of care.  Submit a nomination using any method below.     OR    [email or web address]          LSO

## 2023-10-11 NOTE — H&P PST ADULT - NS SC CAGE ALCOHOL ANNOYED YOU
Acute Care - Physical Therapy Discharge Summary  [facility]       Patient Name: Thao Mohr  : 1944  MRN: [id number]    Today's Date: 10/11/2023                 Admit Date: 10/3/2023      PT Recommendation and Plan    Visit Dx:    ICD-10-CM ICD-9-CM   1. Impaired functional mobility and activity tolerance [Z74.09]  Z74.09 V49.89        Outcome Measures       Row Name 10/10/23 1100             How much help from another person do you currently need...    Turning from your back to your side while in flat bed without using bedrails? 4  -JE (r) TOBIAS (t) JE (c)      Moving from lying on back to sitting on the side of a flat bed without bedrails? 3  -JE (r) TOBIAS (t) JE (c)      Moving to and from a bed to a chair (including a wheelchair)? 2  -JE (r) TOBIAS (t) JE (c)      Standing up from a chair using your arms (e.g., wheelchair, bedside chair)? 3  -JE (r) TOBIAS (t) JE (c)      Climbing 3-5 steps with a railing? 2  -JE (r) TOBIAS (t) JE (c)      To walk in hospital room? 3  -JE (r) TOBIAS (t) JE (c)      AM-PAC 6 Clicks Score (PT) 17  -JE (r) TOBIAS (t)      Highest level of mobility 5 --> Static standing  -JE (r) TOBIAS (t)                User Key  (r) = Recorded By, (t) = Taken By, (c) = Cosigned By      Initials Name Provider Type    Alicia Coleman, PT Physical Therapist    Jose Luis Ramos, PTA Student PTA Student                         PT Rehab Goals       Row Name 10/11/23 1000             Bed Mobility Goal 1 (PT)    Activity/Assistive Device (Bed Mobility Goal 1, PT) sit to supine;supine to sit  -AB      [place] Level/Cues Needed (Bed Mobility Goal 1, PT) standby assist  -AB      Time Frame (Bed Mobility Goal 1, PT) long term goal (LTG);by discharge  -AB      Progress/Outcomes (Bed Mobility Goal 1, PT) goal not met  -AB         Transfer Goal 1 (PT)    Activity/Assistive Device (Transfer Goal 1, PT) sit-to-stand/stand-to-sit;bed-to-chair/chair-to-bed  -AB      [place] Level/Cues Needed (Transfer Goal 1, PT)  standby assist  -AB      Time Frame (Transfer Goal 1, PT) long term goal (LTG);by discharge  -AB      Progress/Outcome (Transfer Goal 1, PT) goal not met  -AB         Gait Training Goal 1 (PT)    Activity/Assistive Device (Gait Training Goal 1, PT) gait (walking locomotion);decrease fall risk;increase endurance/gait distance;improve balance and speed  -AB      [place] Level (Gait Training Goal 1, PT) standby assist  -AB      Distance (Gait Training Goal 1, PT) 50  -AB      Time Frame (Gait Training Goal 1, PT) long term goal (LTG);by discharge  -AB      Progress/Outcome (Gait Training Goal 1, PT) goal not met  -AB         Stairs Goal 1 (PT)    Activity/Assistive Device (Stairs Goal 1, PT) ascending stairs;descending stairs  -AB      Barth[place]omew Level/Cues Needed (Stairs Goal 1, PT) contact guard required  -AB      Number of Stairs (Stairs Goal 1, PT) 2  -AB      Time Frame (Stairs Goal 1, PT) long term goal (LTG);by discharge  -AB      Progress/Outcome (Stairs Goal 1, PT) goal not met  -AB                User Key  (r) = Recorded By, (t) = Taken By, (c) = Cosigned By      Initials Name Provider Type Discipline    Diane Kowalski PTA Physical Therapist Assistant PT                        PT Discharge Summary  Anticipated Discharge Disposition (PT): home with home health, home with assist  Reason for Discharge: Discharge from facility  Outcomes Achieved: Refer to plan of care for updates on goals achieved  Discharge Destination: Home with assist      Diane De Santiago PTA   10/11/2023        no

## 2023-11-28 ENCOUNTER — OUTPATIENT (OUTPATIENT)
Dept: OUTPATIENT SERVICES | Facility: HOSPITAL | Age: 78
LOS: 1 days | End: 2023-11-28
Payer: MEDICARE

## 2023-11-28 ENCOUNTER — APPOINTMENT (OUTPATIENT)
Dept: MRI IMAGING | Facility: IMAGING CENTER | Age: 78
End: 2023-11-28
Payer: MEDICARE

## 2023-11-28 DIAGNOSIS — Z98.890 OTHER SPECIFIED POSTPROCEDURAL STATES: Chronic | ICD-10-CM

## 2023-11-28 DIAGNOSIS — K83.8 OTHER SPECIFIED DISEASES OF BILIARY TRACT: ICD-10-CM

## 2023-11-28 DIAGNOSIS — Z90.13 ACQUIRED ABSENCE OF BILATERAL BREASTS AND NIPPLES: Chronic | ICD-10-CM

## 2023-11-28 PROCEDURE — A9585: CPT

## 2023-11-28 PROCEDURE — 74183 MRI ABD W/O CNTR FLWD CNTR: CPT | Mod: 26,MH

## 2023-11-28 PROCEDURE — 74183 MRI ABD W/O CNTR FLWD CNTR: CPT | Mod: MH

## 2024-05-14 ENCOUNTER — OFFICE (OUTPATIENT)
Dept: URBAN - METROPOLITAN AREA CLINIC 27 | Facility: CLINIC | Age: 79
Setting detail: OPHTHALMOLOGY
End: 2024-05-14
Payer: MEDICARE

## 2024-05-14 DIAGNOSIS — H00.14: ICD-10-CM

## 2024-05-14 DIAGNOSIS — H25.13: ICD-10-CM

## 2024-05-14 DIAGNOSIS — H18.413: ICD-10-CM

## 2024-05-14 DIAGNOSIS — H15.101: ICD-10-CM

## 2024-05-14 DIAGNOSIS — L03.213: ICD-10-CM

## 2024-05-14 PROCEDURE — 99213 OFFICE O/P EST LOW 20 MIN: CPT | Performed by: OPHTHALMOLOGY

## 2024-05-14 ASSESSMENT — LID EXAM ASSESSMENTS: OS_EDEMA: LUL 2+ 3+

## 2025-02-24 ENCOUNTER — APPOINTMENT (OUTPATIENT)
Dept: CT IMAGING | Facility: CLINIC | Age: 80
End: 2025-02-24